# Patient Record
Sex: FEMALE | NOT HISPANIC OR LATINO | Employment: UNEMPLOYED | URBAN - METROPOLITAN AREA
[De-identification: names, ages, dates, MRNs, and addresses within clinical notes are randomized per-mention and may not be internally consistent; named-entity substitution may affect disease eponyms.]

---

## 2017-08-08 ENCOUNTER — ANESTHESIA - HEALTHEAST (OUTPATIENT)
Dept: OBGYN | Facility: CLINIC | Age: 32
End: 2017-08-08

## 2017-08-08 ASSESSMENT — MIFFLIN-ST. JEOR: SCORE: 1251.07

## 2017-08-09 ENCOUNTER — SURGERY - HEALTHEAST (OUTPATIENT)
Dept: OBGYN | Facility: CLINIC | Age: 32
End: 2017-08-09

## 2017-08-14 ENCOUNTER — COMMUNICATION - HEALTHEAST (OUTPATIENT)
Dept: OBGYN | Facility: CLINIC | Age: 32
End: 2017-08-14

## 2017-08-15 ENCOUNTER — COMMUNICATION - HEALTHEAST (OUTPATIENT)
Dept: OBGYN | Facility: CLINIC | Age: 32
End: 2017-08-15

## 2018-02-05 ENCOUNTER — COMMUNICATION - HEALTHEAST (OUTPATIENT)
Dept: SCHEDULING | Facility: CLINIC | Age: 33
End: 2018-02-05

## 2018-02-07 ENCOUNTER — COMMUNICATION - HEALTHEAST (OUTPATIENT)
Dept: SCHEDULING | Facility: CLINIC | Age: 33
End: 2018-02-07

## 2019-02-11 ENCOUNTER — OFFICE VISIT (OUTPATIENT)
Dept: FAMILY MEDICINE | Facility: CLINIC | Age: 34
End: 2019-02-11
Payer: COMMERCIAL

## 2019-02-11 VITALS
HEART RATE: 86 BPM | WEIGHT: 113.2 LBS | OXYGEN SATURATION: 99 % | SYSTOLIC BLOOD PRESSURE: 111 MMHG | TEMPERATURE: 99.8 F | DIASTOLIC BLOOD PRESSURE: 73 MMHG

## 2019-02-11 DIAGNOSIS — J06.9 VIRAL UPPER RESPIRATORY ILLNESS: Primary | ICD-10-CM

## 2019-02-11 DIAGNOSIS — J02.9 SORE THROAT: ICD-10-CM

## 2019-02-11 LAB
DEPRECATED S PYO AG THROAT QL EIA: NORMAL
SPECIMEN SOURCE: NORMAL

## 2019-02-11 PROCEDURE — 87081 CULTURE SCREEN ONLY: CPT | Performed by: NURSE PRACTITIONER

## 2019-02-11 PROCEDURE — 99213 OFFICE O/P EST LOW 20 MIN: CPT | Performed by: NURSE PRACTITIONER

## 2019-02-11 PROCEDURE — 87880 STREP A ASSAY W/OPTIC: CPT | Performed by: NURSE PRACTITIONER

## 2019-02-11 NOTE — PATIENT INSTRUCTIONS
1.  Rest and push fluids.  2.  Information below on sore throat and upper respiratory infections.  3.  Rapid strep was negative, culture is pending.  I will call you tomorrow if results are positive for treatment.  4.  Follow-up in clinic if any persistent or worsening symptoms over the next week.    Self-Care for Sore Throats    Sore throats happen for many reasons, such as colds, allergies, and infections caused by viruses or bacteria. In any case, your throat becomes red and sore. Your goal for self-care is to reduce your discomfort while giving your throat a chance to heal.  Moisten and soothe your throat  Tips include the following:    Try a sip of water first thing after waking up.    Keep your throat moist by drinking 6 or more glasses of clear liquids every day.    Run a cool-air humidifier in your room overnight.    Avoid cigarette smoke.     Suck on throat lozenges, cough drops, hard candy, ice chips, or frozen fruit-juice bars. Use the sugar-free versions if your diet or medical condition requires them.  Gargle to ease irritation  Gargling every hour or 2 can ease irritation. Try gargling with 1 of these solutions:    1/4 teaspoon of salt in 1/2 cup of warm water    An over-the-counter anesthetic gargle  Use medicine for more relief  Over-the-counter medicine can reduce sore throat symptoms. Ask your pharmacist if you have questions about which medicine to use:    Ease pain with anesthetic sprays. Aspirin or an aspirin substitute also helps. Remember, never give aspirin to anyone 18 or younger, or if you are already taking blood thinners.     For sore throats caused by allergies, try antihistamines to block the allergic reaction.    Remember: unless a sore throat is caused by a bacterial infection, antibiotics won t help you.  Prevent future sore throats  Prevention tips include the following:    Stop smoking or reduce contact with secondhand smoke. Smoke irritates the tender throat lining.    Limit  contact with pets and with allergy-causing substances, such as pollen and mold.    When you re around someone with a sore throat or cold, wash your hands often to keep viruses or bacteria from spreading.    Don t strain your vocal cords.  Call your healthcare provider  Contact your healthcare provider if you have:    A temperature over 101 F (38.3 C)    White spots on the throat    Great difficulty swallowing    Trouble breathing    A skin rash    Recent exposure to someone else with strep bacteria    Severe hoarseness and swollen glands in the neck or jaw   Date Last Reviewed: 8/1/2016 2000-2018 [employer]. [address] [place] [address]. All rights reserved. This information is not intended as a substitute for professional medical care. Always follow your healthcare professional's instructions.           Patient Education     Viral Upper Respiratory Illness (Adult)  You have a viral upper respiratory illness (URI), which is another term for the common cold. This illness is contagious during the first few days. It is spread through the air by coughing and sneezing. It may also be spread by direct contact (touching the sick person and then touching your own eyes, nose, or mouth). Frequent handwashing will decrease risk of spread. Most viral illnesses go away within 7 to 10 days with rest and simple home remedies. Sometimes the illness may last for several weeks. Antibiotics will not kill a virus, and they are generally not prescribed for this condition.    Home care    If symptoms are severe, rest at home for the first 2 to 3 days. When you resume activity, don't let yourself get too tired.    Don't smoke. If you need help stopping, talk with your healthcare provider.    Avoid being exposed to cigarette smoke (yours or others ).    You may use acetaminophen or ibuprofen to control pain and fever, unless another medicine was prescribed. If you have chronic liver or kidney disease, have  ever had a stomach ulcer or gastrointestinal bleeding, or are taking blood-thinning medicines, talk with your healthcare provider before using these medicines. Aspirin should never be given to anyone under 18 years of age who is ill with a viral infection or fever. It may cause severe liver or brain damage.    Your appetite may be poor, so a light diet is fine. Stay well hydrated by drinking 6 to 8 glasses of fluids per day (water, soft drinks, juices, tea, or soup). Extra fluids will help loosen secretions in the nose and lungs.    Over-the-counter cold medicines will not shorten the length of time you re sick, but they may be helpful for the following symptoms: cough, sore throat, and nasal and sinus congestion. If you take prescription medicines, ask your healthcare provider or pharmacist which over-the-counter medicines are safe to use. (Note: Don't use decongestants if you have high blood pressure.)  Follow-up care  Follow up with your healthcare provider, or as advised.  When to seek medical advice  Call your healthcare provider right away if any of these occur:    Cough with lots of colored sputum (mucus)    Severe headache; face, neck, or ear pain    Difficulty swallowing due to throat pain    Fever of 100.4 F (38 C) or higher, or as directed by your healthcare provider  Call 911  Call 911 if any of these occur:    Chest pain, shortness of breath, wheezing, or difficulty breathing    Coughing up blood    Very severe pain with swallowing, especially if it goes along with a muffled voice   Date Last Reviewed: 6/1/2018 2000-2018 [employer]. [address] [place] [address], [place] [address]. All rights reserved. This information is not intended as a substitute for professional medical care. Always follow your healthcare professional's instructions.

## 2019-02-11 NOTE — LETTER
February 12, 2019      Aura Hope  [id number] [facility] [id number]          The results of your 24 hour throat culture were negative. If you have any further questions please contact your clinic.            Sincerely,        Maranda Craig NP

## 2019-02-11 NOTE — PROGRESS NOTES
SUBJECTIVE:   Aura Hope is a 34 year old female who presents to clinic today for the following health issues:    ENT Symptoms             Symptoms: cc Present Absent Comment   Fever/Chills  x  Low grade   Fatigue  x     Muscle Aches  x     Eye Irritation   x    Sneezing   x    Nasal [place]/Drg   x    Sinus Pressure/Pain   x    Loss of smell   x    Dental pain   x    Sore Throat x x     Swollen Glands  x     Ear Pain/Fullness   x    Cough   x    Wheeze   x    Chest Pain   x    Shortness of breath   x    Rash   x    Other   x      Symptom duration:  2 days    Symptom severity:  moderate   Treatments tried:  nyquil   Contacts:  family      Decrease in appetite but drinking fluids.  Hx of strep in the past.    Problem list and histories reviewed & adjusted, as indicated.  Additional history: as documented    There is no problem list on file for this patient.    History reviewed. No pertinent surgical history.    Social History     Tobacco Use     Smoking status: Never Smoker     Smokeless tobacco: Never Used   Substance Use Topics     Alcohol use: Not on file     History reviewed. No pertinent family history.      No current outpatient medications on file.     Not on File    Reviewed and updated as needed this visit by clinical staff  Tobacco  Allergies  Meds  Problems  Med Hx  Surg Hx  Fam Hx  Soc Hx        Reviewed and updated as needed this visit by Provider  Tobacco  Allergies  Meds  Problems  Med Hx  Surg Hx  Fam Hx         ROS:  CONSTITUTIONAL: NEGATIVE for fever, chills, change in weight  INTEGUMENTARY/SKIN: NEGATIVE for worrisome rashes, moles or lesions  ENT/MOUTH: POSITIVE for sore throat  RESP: NEGATIVE for significant cough or SOB  CV: NEGATIVE for chest pain, palpitations or peripheral edema  GI: NEGATIVE for nausea, abdominal pain, heartburn, or change in bowel habits  PSYCHIATRIC: NEGATIVE for changes in mood or affect  ROS otherwise negative    OBJECTIVE:     /73   Pulse 86    "Temp 99.8  F (37.7  C) (Tympanic)   Wt 51.3 kg (113 lb 3.2 oz)   .9 cm (61\")   SpO2 99%   There is no height or weight on file to calculate BMI.  GENERAL: healthy, alert and no distress  EYES: Eyes grossly normal to inspection, PERRL and conjunctivae and sclerae normal  HENT: normal cephalic/atraumatic, ear canals and TM's normal, nose and mouth without ulcers or lesions, oropharynx clear, oral mucous membranes moist and tonsillar hypertrophy  NECK: no adenopathy and no asymmetry, masses, or scars  RESP: lungs clear to auscultation - no rales, rhonchi or wheezes  CV: regular rate and rhythm, normal S1 S2, no S3 or S4, no murmur, click or rub, no peripheral edema and peripheral pulses strong  PSYCH: mentation appears normal, affect normal/bright    Diagnostic Test Results:  Results for orders placed or performed in visit on 02/11/19 (from the past 24 hour(s))   Strep, Rapid Screen   Result Value Ref Range    Specimen Description Throat     Rapid Strep A Screen       NEGATIVE: No Group A streptococcal antigen detected by immunoassay, await culture report.       ASSESSMENT/PLAN:     1. Pharyngitis  Rapid strep negative.  Culture pending.  I will call tomorrow if culture is positive for treatment.  Most likely viral URI cause.  - Strep, Rapid Screen  - Beta strep group A culture    2. Viral upper respiratory illness  Treating with conservative management.  Handout given and discussed.  Follow-up in clinic if any persistent or worsening symptoms.      See Patient Instructions    Maranda Craig NP  [facility]  "

## 2019-02-12 LAB
BACTERIA SPEC CULT: NORMAL
SPECIMEN SOURCE: NORMAL

## 2019-08-27 ENCOUNTER — TRANSFERRED RECORDS (OUTPATIENT)
Dept: HEALTH INFORMATION MANAGEMENT | Facility: CLINIC | Age: 34
End: 2019-08-27

## 2019-08-27 ENCOUNTER — RECORDS - HEALTHEAST (OUTPATIENT)
Dept: ADMINISTRATIVE | Facility: OTHER | Age: 34
End: 2019-08-27

## 2019-08-27 LAB
C TRACH DNA SPEC QL PROBE+SIG AMP: NEGATIVE
CHLAMYDIA_EXT- HISTORICAL: NEGATIVE
HPV ABSTRACT: ABNORMAL
HPV_EXT - HISTORICAL: ABNORMAL
PAP SMEAR - HIM PATIENT REPORTED: ABNORMAL
PAP-ABSTRACT: ABNORMAL
SPECIMEN DESCRIPTION: NORMAL
SPECIMEN DESCRIPTION_EXT (HISTORICAL CONVERSION): NORMAL

## 2019-10-25 ENCOUNTER — OFFICE VISIT - HEALTHEAST (OUTPATIENT)
Dept: FAMILY MEDICINE | Facility: CLINIC | Age: 34
End: 2019-10-25

## 2019-10-25 DIAGNOSIS — J02.9 SORE THROAT: ICD-10-CM

## 2019-10-25 DIAGNOSIS — J02.9 VIRAL PHARYNGITIS: ICD-10-CM

## 2019-10-25 LAB — DEPRECATED S PYO AG THROAT QL EIA: NORMAL

## 2019-10-26 LAB — GROUP A STREP BY PCR: NORMAL

## 2019-11-08 ENCOUNTER — COMMUNICATION - HEALTHEAST (OUTPATIENT)
Dept: ADMINISTRATIVE | Facility: CLINIC | Age: 34
End: 2019-11-08

## 2019-11-21 ENCOUNTER — COMMUNICATION - HEALTHEAST (OUTPATIENT)
Dept: ADMINISTRATIVE | Facility: CLINIC | Age: 34
End: 2019-11-21

## 2019-11-21 ENCOUNTER — AMBULATORY - HEALTHEAST (OUTPATIENT)
Dept: OBGYN | Facility: CLINIC | Age: 34
End: 2019-11-21

## 2019-11-21 DIAGNOSIS — Z01.812 PRE-PROCEDURE LAB EXAM: ICD-10-CM

## 2019-12-10 ENCOUNTER — RECORDS - HEALTHEAST (OUTPATIENT)
Dept: HEALTH INFORMATION MANAGEMENT | Facility: CLINIC | Age: 34
End: 2019-12-10

## 2019-12-31 ENCOUNTER — AMBULATORY - HEALTHEAST (OUTPATIENT)
Dept: LAB | Facility: CLINIC | Age: 34
End: 2019-12-31

## 2019-12-31 ENCOUNTER — AMBULATORY - HEALTHEAST (OUTPATIENT)
Dept: OBGYN | Facility: CLINIC | Age: 34
End: 2019-12-31

## 2019-12-31 DIAGNOSIS — Z01.812 PRE-PROCEDURE LAB EXAM: ICD-10-CM

## 2019-12-31 DIAGNOSIS — R87.610 ASCUS WITH POSITIVE HIGH RISK HPV CERVICAL: ICD-10-CM

## 2019-12-31 DIAGNOSIS — R87.810 ASCUS WITH POSITIVE HIGH RISK HPV CERVICAL: ICD-10-CM

## 2019-12-31 LAB — HCG UR QL: NEGATIVE

## 2019-12-31 ASSESSMENT — MIFFLIN-ST. JEOR: SCORE: 1155.81

## 2020-02-05 ENCOUNTER — OFFICE VISIT (OUTPATIENT)
Dept: FAMILY MEDICINE | Facility: CLINIC | Age: 35
End: 2020-02-05
Payer: COMMERCIAL

## 2020-02-05 VITALS
WEIGHT: 114 LBS | HEIGHT: 62 IN | TEMPERATURE: 98.6 F | OXYGEN SATURATION: 100 % | SYSTOLIC BLOOD PRESSURE: 100 MMHG | BODY MASS INDEX: 20.98 KG/M2 | HEART RATE: 66 BPM | DIASTOLIC BLOOD PRESSURE: 70 MMHG

## 2020-02-05 DIAGNOSIS — K14.6 SORENESS OF TONGUE: Primary | ICD-10-CM

## 2020-02-05 PROCEDURE — 99212 OFFICE O/P EST SF 10 MIN: CPT | Performed by: NURSE PRACTITIONER

## 2020-02-05 ASSESSMENT — MIFFLIN-ST. JEOR: SCORE: 1165.35

## 2020-02-05 NOTE — PROGRESS NOTES
"David Hope is a 35 year old female who presents to clinic today for the following health issues:    HPI   Mouth soreness      Duration: 3 days    Description (location/character/radiation): patient here c/o soreness on right side of tongue three days ago, now has pain in right ear and feels a swollen lymph node in neck    Intensity:  moderate    Accompanying signs and symptoms: denies fever;     She doesn't feel sick.    History (similar episodes/previous evaluation): None    No new contacts or exposures.    Precipitating or alleviating factors: None    Therapies tried and outcome: None               Reviewed and updated as needed this visit by Provider  Tobacco  Allergies  Meds  Problems  Med Hx  Surg Hx  Fam Hx         Review of Systems   ROS COMP: Constitutional, HEENT, cardiovascular, pulmonary, gi and gu systems are negative, except as otherwise noted.      Objective    /70 (BP Location: Right arm)   Pulse 66   Temp 98.6  F (37  C) (Tympanic)   Ht 1.575 m (5' 2\")   Wt 51.7 kg (114 lb)   SpO2 100%   Breastfeeding No   BMI 20.85 kg/m    Body mass index is 20.85 kg/m .  Physical Exam   GENERAL: healthy, alert and no distress  HENT: ear canals and TM's normal, nose and mouth without ulcers or lesions  NECK: no adenopathy, no asymmetry, masses, or scars and thyroid normal to palpation  RESP: lungs clear to auscultation - no rales, rhonchi or wheezes  CV: regular rate and rhythm, normal S1 S2, no S3 or S4, no murmur, click or rub, no peripheral edema and peripheral pulses strong            Assessment & Plan       ICD-10-CM    1. Soreness of tongue K14.6      Soreness of right lateral tongue for 3 days.  Now with referred pain to the right ear.  Exam was benign - no tongue or ear abnormality seen.  No lymphadenopathy.    Recommend monitoring.  Follow up in one week if symptoms worsen or do not improve.         Return in about 1 week (around 2/12/2020), or if symptoms worsen or fail " to improve.    The risks, benefits and treatment options of prescribed medications or other treatments have been discussed with the patient. The patient verbalized their understanding and should call or follow up if no improvement or if they develop further problems.    CHRISTINE Yao [facility]

## 2020-02-06 ENCOUNTER — MYC MEDICAL ADVICE (OUTPATIENT)
Dept: FAMILY MEDICINE | Facility: CLINIC | Age: 35
End: 2020-02-06

## 2020-02-07 ENCOUNTER — OFFICE VISIT (OUTPATIENT)
Dept: FAMILY MEDICINE | Facility: CLINIC | Age: 35
End: 2020-02-07
Payer: COMMERCIAL

## 2020-02-07 VITALS
DIASTOLIC BLOOD PRESSURE: 62 MMHG | HEART RATE: 65 BPM | WEIGHT: 116.8 LBS | BODY MASS INDEX: 21.36 KG/M2 | SYSTOLIC BLOOD PRESSURE: 104 MMHG | OXYGEN SATURATION: 99 % | TEMPERATURE: 98.6 F

## 2020-02-07 DIAGNOSIS — H92.01 OTALGIA, RIGHT: Primary | ICD-10-CM

## 2020-02-07 PROCEDURE — 99213 OFFICE O/P EST LOW 20 MIN: CPT | Performed by: NURSE PRACTITIONER

## 2020-02-07 RX ORDER — IBUPROFEN 200 MG
200 TABLET ORAL EVERY 4 HOURS PRN
COMMUNITY
End: 2022-04-19

## 2020-02-07 NOTE — PROGRESS NOTES
David Hope is a 35 year old female who presents to clinic today for the following health issues:    HPI   Acute Illness   Acute illness concerns?- Ear pain  Onset: 4-5 days    Fever: no    Decreased energy level: no    Conjunctivitis:  YES- blurry    Ear Pain: YES: bilateral    Rhinorrhea: no     Congestion: no    Sore Throat: no     Cough: no    Wheeze: no    Breathing fast: no    Decreased Appetite: no    Nausea: no    Vomiting: no    Diarrhea:  YES    Sick/Strep Exposure: no       Therapies Tried and outcome: Ibuprofen-has been helping and she can tell when it wears off.     Tongue symptoms have resolved.    Kids are sick with colds      There is no problem list on file for this patient.    History reviewed. No pertinent surgical history.    Social History     Tobacco Use     Smoking status: Never Smoker     Smokeless tobacco: Never Used   Substance Use Topics     Alcohol use: Yes     Comment: 0-1 drink per week     History reviewed. No pertinent family history.      Current Outpatient Medications   Medication Sig Dispense Refill     ibuprofen (ADVIL/MOTRIN) 200 MG tablet Take 200 mg by mouth every 4 hours as needed for mild pain       No Known Allergies    Reviewed and updated as needed this visit by Provider  Tobacco  Allergies  Meds  Problems  Med Hx  Surg Hx  Fam Hx         Review of Systems   ROS COMP: Constitutional, HEENT, cardiovascular, pulmonary, gi and gu systems are negative, except as otherwise noted.      Objective    /62 (BP Location: Right arm, Patient Position: Sitting, Cuff Size: Adult Regular)   Pulse 65   Temp 98.6  F (37  C) (Tympanic)   Wt 53 kg (116 lb 12.8 oz)   SpO2 99%   BMI 21.36 kg/m    Body mass index is 21.36 kg/m .  Physical Exam   GENERAL: healthy, alert and no distress  HENT: ear canals and TM's normal, nose and mouth without ulcers or lesions  NECK: no adenopathy, no asymmetry, masses, or scars and thyroid normal to palpation             Assessment & Plan       ICD-10-CM    1. Otalgia, right H92.01 OTOLARYNGOLOGY REFERRAL     Again exam is negative.  Possible viral syndrome.  However, advised patient to see ENT next week if no improvement in ear pain.        Return in about 1 week (around 2/14/2020), or if symptoms worsen or fail to improve.    The risks, benefits and treatment options of prescribed medications or other treatments have been discussed with the patient. The patient verbalized their understanding and should call or follow up if no improvement or if they develop further problems.    CHRISTINE Yao [facility]

## 2020-03-11 ENCOUNTER — HEALTH MAINTENANCE LETTER (OUTPATIENT)
Age: 35
End: 2020-03-11

## 2021-01-03 ENCOUNTER — HEALTH MAINTENANCE LETTER (OUTPATIENT)
Age: 36
End: 2021-01-03

## 2021-02-10 ENCOUNTER — OFFICE VISIT (OUTPATIENT)
Dept: FAMILY MEDICINE | Facility: CLINIC | Age: 36
End: 2021-02-10
Payer: COMMERCIAL

## 2021-02-10 VITALS
WEIGHT: 114 LBS | DIASTOLIC BLOOD PRESSURE: 62 MMHG | SYSTOLIC BLOOD PRESSURE: 98 MMHG | HEIGHT: 62 IN | BODY MASS INDEX: 20.98 KG/M2 | RESPIRATION RATE: 18 BRPM | TEMPERATURE: 98.6 F | OXYGEN SATURATION: 98 % | HEART RATE: 74 BPM

## 2021-02-10 DIAGNOSIS — J02.9 SORE THROAT: Primary | ICD-10-CM

## 2021-02-10 LAB
DEPRECATED S PYO AG THROAT QL EIA: NEGATIVE
SPECIMEN SOURCE: NORMAL
SPECIMEN SOURCE: NORMAL
STREP GROUP A PCR: NOT DETECTED

## 2021-02-10 PROCEDURE — 99213 OFFICE O/P EST LOW 20 MIN: CPT | Performed by: NURSE PRACTITIONER

## 2021-02-10 PROCEDURE — 99N1174 PR STATISTIC STREP A RAPID: Performed by: NURSE PRACTITIONER

## 2021-02-10 PROCEDURE — 87651 STREP A DNA AMP PROBE: CPT | Performed by: NURSE PRACTITIONER

## 2021-02-10 ASSESSMENT — MIFFLIN-ST. JEOR: SCORE: 1160.35

## 2021-02-10 NOTE — PROGRESS NOTES
Assessment & Plan     Sore throat    - Streptococcus A Rapid Scr w Reflx to PCR  - Group A Streptococcus PCR Throat Swab  Strep negative, see below for plan.  She states she has a home COVID test and may consider doing that if she doesn't feel better soon.           See Patient Instructions  Patient Instructions   Strep was negative.  Continue to monitor symptoms.  Rest, fluids, humidify the air, warm salt water gargles and ibuprofen as needed.      Our Clinic hours are:  Mondays    7:20 am - 7 pm  Tues -  Fri  7:20 am - 5 pm    Clinic Phone: [phone]    The clinic lab opens at 7:30 am Mon - Fri and appointments are required.    [facility]. [phone]  Monday  8 am - 7pm  Tues - Fri 8 am - 5:30 pm             Return in about 1 week (around 2/17/2021).    CHRISTINE Watson CNP  [facility]    David Chaavrria is a 36 year old who presents for the following health issues  accompanied by her self :    HPI       Acute Illness  Acute illness concerns: sore throat   Onset/Duration: 2 weeks   Symptoms: Patient has had a ST for 2 weeks she has been using tea and increased fluids   Fever: no  Chills/Sweats: no  Headache (location?): YES  Sinus Pressure: no  Conjunctivitis:  no  Ear Pain: no  Rhinorrhea: no  Congestion: no  Sore Throat: YES-   Cough: no  Wheeze: no  Decreased Appetite: no  Nausea: no  Vomiting: no  Diarrhea: no  Dysuria/Freq.: no  Dysuria or Hematuria: no  Fatigue/Achiness: YES- little   Sick/Strep Exposure: no  Therapies tried and outcome: Patient used  ibuprofen 3 days ago but has been using tea increased liquids       Review of Systems   See above      Objective    There were no vitals taken for this visit.  There is no height or weight on file to calculate BMI.  Physical Exam   GENERAL: healthy, alert and no distress  HENT: ear canals and TM's normal, pharynx without erythema, tonsils are 2+  NECK: mild tonsillar adenopathy, no  asymmetry  RESP: lungs clear to auscultation - no rales, rhonchi or wheezes  CV: regular rate and rhythm, normal S1 S2, no S3 or S4, no murmur  MS: no gross musculoskeletal defects noted      Results for orders placed or performed in visit on 02/10/21 (from the past 24 hour(s))   Streptococcus A Rapid Scr w Reflx to PCR    Specimen: Throat   Result Value Ref Range    Strep Specimen Description Throat     Streptococcus Group A Rapid Screen Negative NEG^Negative

## 2021-02-10 NOTE — PATIENT INSTRUCTIONS
Strep was negative.  Continue to monitor symptoms.  Rest, fluids, humidify the air, warm salt water gargles and ibuprofen as needed.      Our Clinic hours are:  Mondays    7:20 am - 7 pm  Tues -  Fri  7:20 am - 5 pm    Clinic Phone: [phone]    The clinic lab opens at 7:30 am Mon - Fri and appointments are required.    [facility]. [phone]  Monday  8 am - 7pm  Tues - Fri 8 am - 5:30 pm

## 2021-02-22 ENCOUNTER — E-VISIT (OUTPATIENT)
Dept: FAMILY MEDICINE | Facility: CLINIC | Age: 36
End: 2021-02-22
Payer: COMMERCIAL

## 2021-02-22 DIAGNOSIS — R51.9 NONINTRACTABLE HEADACHE, UNSPECIFIED CHRONICITY PATTERN, UNSPECIFIED HEADACHE TYPE: Primary | ICD-10-CM

## 2021-02-22 PROCEDURE — 99421 OL DIG E/M SVC 5-10 MIN: CPT | Performed by: NURSE PRACTITIONER

## 2021-02-23 ENCOUNTER — E-VISIT (OUTPATIENT)
Dept: FAMILY MEDICINE | Facility: CLINIC | Age: 36
End: 2021-02-23
Payer: COMMERCIAL

## 2021-02-23 DIAGNOSIS — J01.90 ACUTE BACTERIAL SINUSITIS: Primary | ICD-10-CM

## 2021-02-23 DIAGNOSIS — B96.89 ACUTE BACTERIAL SINUSITIS: Primary | ICD-10-CM

## 2021-02-23 PROCEDURE — 99421 OL DIG E/M SVC 5-10 MIN: CPT | Performed by: NURSE PRACTITIONER

## 2021-02-24 NOTE — PATIENT INSTRUCTIONS
Dear Aura Hope    After reviewing your responses, I've been able to diagnose you with?a sinus infection caused by bacteria.?     Based on your responses and diagnosis, I have prescribed Augmentin to treat your symptoms. I have sent this to your pharmacy.?     It is also important to stay well hydrated, get lots of rest and take over-the-counter decongestants,?tylenol?or ibuprofen if you?are able to?take those medications per your primary care provider to help relieve discomfort.?     It is important that you take?all of?your prescribed medication even if your symptoms are improving after a few doses.? Taking?all of?your medicine helps prevent the symptoms from returning.?     If your symptoms worsen, you develop severe headache, vomiting, high fever (>102), or are not improving in 7 days, please contact your primary care provider for an appointment or visit any of our convenient Walk-in Care or Urgent Care Centers to be seen which can be found on our website?here.?     Thanks again for choosing?us?as your health care partner,?   ?  CHRISTINE Yao CNP?   You may want to try a nasal lavage (also known as nasal irrigation). You can find over-the-counter products, such as Neti-Pot, at retail locations or make your own at home. Instructions for homemade nasal lavage and more information on the process are available online at http://www.aafp.org/[email or web address]/1115/p1121.html.      Sinusitis (Antibiotic Treatment)    The sinuses are air-filled spaces within the bones of the face. They connect to the inside of the nose. Sinusitis is an inflammation of the tissue that lines the sinuses. Sinusitis can occur during a cold. It can also happen due to allergies to pollens and other particles in the air. Sinusitis can cause symptoms of sinus congestion and a feeling of fullness. A sinus infection causes fever, headache, and facial pain. There is often green or yellow fluid draining from the nose or into the back of the  throat (post-nasal drip). You have been given antibiotics to treat this condition.   Home care    Take the full course of antibiotics as instructed. Don't stop taking them, even when you feel better.    Drink plenty of water, hot tea, and other liquids as directed by the healthcare provider. This may help thin nasal mucus. It also may help your sinuses drain fluids.    Heat may help soothe painful areas of your face. Use a towel soaked in hot water. Or,  the shower and direct the warm spray onto your face. Using a vaporizer along with a menthol rub at night may also help soothe symptoms.     An expectorant with guaifenesin may help thin nasal mucus and help your sinuses drain fluids. Talk with your provider or pharmacists before taking an over-the-counter (OTC) medicine if you have any questions about it or its side effects..    You can use an OTC decongestant, unless a similar medicine was prescribed to you. Nasal sprays work the fastest. Use one that contains phenylephrine or oxymetazoline. First blow your nose gently. Then use the spray. Don't use these medicines more often than directed on the label. If you do, your symptoms may get worse. You may also take pills that contain pseudoephedrine. Don t use products that combine multiple medicines. This is because side effects may be increased. Read labels. You can also ask the pharmacist for help. (People with high blood pressure should not use decongestants. They can raise blood pressure.) Talk with your provider or pharmacist if you have any questions about the medicine..    OTC antihistamines may help if allergies contributed to your sinusitis. Talk with your provider or pharmacist if you have any questions about the medicine..    Don't use nasal rinses or irrigation during an acute sinus infection, unless your healthcare provider tells you to. Rinsing may spread the infection to other areas in your sinuses.    Use acetaminophen or ibuprofen to control  pain, unless another pain medicine was prescribed to you. If you have chronic liver or kidney disease or ever had a stomach ulcer, talk with your healthcare provider before using these medicines. Never give aspirin to anyone under age 18 who is ill with a fever. It may cause severe liver damage.    Don't smoke. This can make symptoms worse.    Follow-up care  Follow up with your healthcare provider, or as advised.   When to seek medical advice  Call your healthcare provider if any of these occur:     Facial pain or headache that gets worse    Stiff neck    Unusual drowsiness or confusion    Swelling of your forehead or eyelids    Symptoms don't go away in 10 days    Vision problems, such as blurred or double vision    Fever of 100.4 F (38 C) or higher, or as directed by your healthcare provider  Call 911  Call 911 if any of these occur:     Seizure    Trouble breathing    Feeling dizzy or faint    Fingernails, skin or lips look blue, purple , or gray  Prevention  Here are steps you can take to help prevent an infection:     Keep good hand washing habits.    Don t have close contact with people who have sore throats, colds, or other upper respiratory infections.    Don t smoke, and stay away from secondhand smoke.    Stay up to date with of your vaccines.  Jott last reviewed this educational content on 12/1/2019 2000-2020 [employer]. [address] [place] [address], [place] [address]. All rights reserved. This information is not intended as a substitute for professional medical care. Always follow your healthcare professional's instructions.

## 2021-04-25 ENCOUNTER — HEALTH MAINTENANCE LETTER (OUTPATIENT)
Age: 36
End: 2021-04-25

## 2021-05-31 VITALS — HEIGHT: 62 IN | BODY MASS INDEX: 24.66 KG/M2 | WEIGHT: 134 LBS

## 2021-06-02 NOTE — PROGRESS NOTES
Chief Complaint   Patient presents with     Headache     Sore Throat     3 days     Nausea       HPI:  Aura Hope is a 34 y.o. female who complains of moderate sore throat onset 3 days ago, as well as nausea & fatigue today. Pt also has a HA but this has been ongoing and she has been seeing her PCP for this; no change in HA. Symptoms are constant in duration. No treatments tried. Denies fever, sinus pressure, congestion, cough, CP, SOB, abd pain, V/D, rash, or any other symptoms. Patient was exposed to strep at work.    Problem List:  2017:  delivery delivered  2017: Pregnant      No past medical history on file.    Social History     Tobacco Use     Smoking status: Never Smoker     Smokeless tobacco: Never Used   Substance Use Topics     Alcohol use: No       Review of Systems   Constitutional: Positive for fatigue. Negative for chills and fever.   HENT: Positive for sore throat. Negative for congestion and sinus pain.    Respiratory: Negative for cough and shortness of breath.    Cardiovascular: Negative for chest pain.   Gastrointestinal: Positive for nausea. Negative for abdominal pain, diarrhea and vomiting.   Skin: Negative for rash.   Neurological: Positive for headaches.   All other systems reviewed and are negative.      Vitals:    10/25/19 1035   BP: 116/76   Patient Position: Sitting   Pulse: 79   Resp: 16   Temp: 98.6  F (37  C)   TempSrc: Oral   SpO2: 100%   Weight: 112 lb (50.8 kg)       Physical Exam   Constitutional: She appears well-developed and well-nourished.  Non-toxic appearance.   HENT:   Head: Normocephalic and atraumatic.   Right Ear: Tympanic membrane, external ear and ear canal normal.   Left Ear: Tympanic membrane and ear canal normal.   Nose: Nose normal.   Mouth/Throat: Uvula is midline. Posterior oropharyngeal erythema (mild) present. No oropharyngeal exudate, posterior oropharyngeal edema or tonsillar abscesses.   Cardiovascular: Normal rate, regular rhythm and  normal heart sounds.   Pulmonary/Chest: Effort normal and breath sounds normal.   Neurological: She is alert.   Skin: Skin is warm and dry.   Psychiatric: She has a normal mood and affect.       Labs:  Recent Results (from the past 72 hour(s))   Rapid Strep A Screen-Throat swab   Result Value Ref Range    Rapid Strep A Antigen No Group A Strep detected, presumptive negative No Group A Strep detected, presumptive negative       Radiology:    No results found.    Clinical Decision Making:    Strep negative, c/w viral etiology. Advised Ibuprofen, Tylenol, increased fluids & rest.    At the end of the encounter, I discussed results, diagnosis, medications. Discussed red flags for immediate return to clinic/ER, as well as indications for follow up if no improvement. Patient understood and agreed to plan. Patient was stable for discharge.    1. Viral pharyngitis     2. Sore throat  Rapid Strep A Screen-Throat swab    Group A Strep, RNA Direct Detection, Throat         Return in about 1 week (around 11/1/2019) for Follow up w/ primary care provider if not improved.    SEBASTIAN Correa, PA-C  [facility]

## 2021-06-03 VITALS
OXYGEN SATURATION: 100 % | RESPIRATION RATE: 16 BRPM | HEART RATE: 79 BPM | SYSTOLIC BLOOD PRESSURE: 116 MMHG | DIASTOLIC BLOOD PRESSURE: 76 MMHG | BODY MASS INDEX: 20.49 KG/M2 | WEIGHT: 112 LBS | TEMPERATURE: 98.6 F

## 2021-06-03 NOTE — TELEPHONE ENCOUNTER
Name of caller: Patient  Phone number where you may be reached: [phone]  Reason for call: Pt is wondering if she cannot locate Dr Jocelyne Zamora, her previous provider, if Dr Vega would be willing to do a Mu[facility] if she provided her recent abnormal pap results. Pt had a bx in the past at [facility] and [facility] and it was not a good experience.  Best time to call back: any  If we don't reach you, is it okay to leave a detailed message? yes         Simponi Counseling:  I discussed with the patient the risks of golimumab including but not limited to myelosuppression, immunosuppression, autoimmune hepatitis, demyelinating diseases, lymphoma, and serious infections.  The patient understands that monitoring is required including a PPD at baseline and must alert us or the primary physician if symptoms of infection or other concerning signs are noted.

## 2021-06-03 NOTE — TELEPHONE ENCOUNTER
We are awaiting records from [facility]. Pt has requested them already. Pls scan into media if we get them far enough ahead of the 12/10 appt otherwise make sure Dr Vega has them for the pt's 12/10 appt at [facility].

## 2021-06-04 VITALS
HEART RATE: 64 BPM | HEIGHT: 62 IN | WEIGHT: 113 LBS | DIASTOLIC BLOOD PRESSURE: 60 MMHG | SYSTOLIC BLOOD PRESSURE: 108 MMHG | BODY MASS INDEX: 20.8 KG/M2

## 2021-06-04 NOTE — PROGRESS NOTES
"Colposcopy Procedure Note    Indications: Pap smear on 8/27/19 showed ASCUS with +other HRHPV.  Pertinent past history includes a colposcopy with biopsy showing ROSSANA I in 2015.  She is having issues with increased stomach pain and bloating and some nausea.  She also notes some increased fatigue.  Her sister was gluten free for 2 years, and her cousin was just diagnosed with lactose intolerance.  She hasn't done any tracking of her diet and symptoms.  She is looking for a new primary since her move to [facility].    Procedure Details   /60   Pulse 64   Ht 5' 2\" (1.575 m)   Wt 113 lb (51.3 kg)   LMP 12/03/2019   Body mass index is 20.67 kg/m .    The reason for procedure is explained, and informed consent is obtained.  Urine hCG is negative.    Speculum is placed in the vagina and visualization of cervix is achieved. The cervix is swabbed with 3% acetic acid solution. Transformation zone is easily seen.  Green filter is applied with no abnormal vessels seen.  Acetowhite epithelium is not seen.  ECC is not done.  Adequate colposcopy.  The patient tolerated the procedure well.    Impression: normal cervix    Plan: Post procedure instructions are reviewed.  The role of HPV in causing cervical pap smear abnormalities, dysplasia, and cancer is reviewed with the patient.  We discussed the role of the immune system and ways to keep it strong.  She was counseled to return to her primary for a Pap with HPV testing as recommended in a year.  I recommended the [facility] in [facility] to find a primary provider.  In the meantime I recommended she track all she eats and drinks along with her symptoms to see if there's a pattern.  "

## 2021-06-12 NOTE — ANESTHESIA PROCEDURE NOTES
Epidural Block    Patient location during procedure: OB  Time Called: 8/8/2017 9:47 PM  Reason for Block:labor epidural  Staffing:  Performing  Anesthesiologist: ANITA PABON  Preanesthetic Checklist  Completed: patient identified, risks, benefits, and alternatives discussed, timeout performed, consent obtained, airway assessed, oxygen available, suction available, emergency drugs available and hand hygiene performed  Procedure  Patient position: sitting  Prep: ChloraPrep  Patient monitoring: continuous pulse oximetry  Approach: midline  Location: L3-L4  Injection technique: RAMIN air  Number of Attempts:1  Needle  Needle type: Tawanda   Needle gauge: 18 G     Catheter in Space: 5  Assessment  Sensory level:  No complications

## 2021-06-12 NOTE — ANESTHESIA CARE TRANSFER NOTE
Last vitals:   Vitals:    08/09/17 0907   BP: 102/52   Pulse: 72   Resp: 16   Temp: 36.7  C (98.1  F)   SpO2: 99%     Patient's level of consciousness is awake  Spontaneous respirations: yes  Maintains airway independently: yes  Dentition unchanged: yes  Oropharynx: oropharynx clear of all foreign objects    QCDR Measures:  ASA# 20 - Surgical Safety Checklist: WHO surgical safety checklist completed prior to induction  PQRS# 430 - Adult PONV Prevention: NA - Not adult patient, not GA or 3 or more risk factors NOT present  ASA# 8 - Peds PONV Prevention: NA - Not pediatric patient, not GA or 2 or more risk factors NOT present  PQRS# 424 - Nathalie-op Temp Management: 4559F - At least one body temp DOCUMENTED => 35.5C or 95.9F within required timeframe  PQRS# 426 - PACU Transfer Protocol: - Transfer of care checklist used  ASA# 14 - Acute Post-op Pain: ASA14B - Patient did NOT experience pain >= 7 out of 10

## 2021-06-12 NOTE — ANESTHESIA PREPROCEDURE EVALUATION
Anesthesia Evaluation      Patient summary reviewed   No history of anesthetic complications     Airway   Mallampati: I  Neck ROM: full   Pulmonary - negative ROS and normal exam                          Cardiovascular - negative ROS and normal exam   Neuro/Psych - negative ROS     Endo/Other - negative ROS   (+) pregnant  (-) no obesity     GI/Hepatic/Renal - negative ROS           Dental - normal exam                        Anesthesia Plan  Planned anesthetic: epidural  Epidural.  Decadron, Zofran.  PE infusion.  Duramorph.  ASA 2     Anesthetic plan and risks discussed with: patient  Anesthesia plan special considerations: antiemetics,   Post-op plan: routine recovery

## 2021-06-16 PROBLEM — Z34.90 PREGNANT: Status: ACTIVE | Noted: 2017-08-08

## 2021-10-10 ENCOUNTER — HEALTH MAINTENANCE LETTER (OUTPATIENT)
Age: 36
End: 2021-10-10

## 2021-10-29 ENCOUNTER — IMMUNIZATION (OUTPATIENT)
Dept: FAMILY MEDICINE | Facility: CLINIC | Age: 36
End: 2021-10-29
Payer: COMMERCIAL

## 2021-10-29 PROCEDURE — 90471 IMMUNIZATION ADMIN: CPT

## 2021-10-29 PROCEDURE — 90686 IIV4 VACC NO PRSV 0.5 ML IM: CPT

## 2022-04-19 ENCOUNTER — OFFICE VISIT (OUTPATIENT)
Dept: FAMILY MEDICINE | Facility: CLINIC | Age: 37
End: 2022-04-19

## 2022-04-19 ENCOUNTER — E-VISIT (OUTPATIENT)
Dept: URGENT CARE | Facility: CLINIC | Age: 37
End: 2022-04-19
Payer: COMMERCIAL

## 2022-04-19 VITALS
OXYGEN SATURATION: 99 % | DIASTOLIC BLOOD PRESSURE: 76 MMHG | TEMPERATURE: 97.8 F | WEIGHT: 113.6 LBS | HEIGHT: 61 IN | HEART RATE: 95 BPM | SYSTOLIC BLOOD PRESSURE: 112 MMHG | RESPIRATION RATE: 14 BRPM | BODY MASS INDEX: 21.45 KG/M2

## 2022-04-19 DIAGNOSIS — J04.0 LARYNGITIS: ICD-10-CM

## 2022-04-19 DIAGNOSIS — R05.9 COUGH: Primary | ICD-10-CM

## 2022-04-19 DIAGNOSIS — J06.9 VIRAL URI WITH COUGH: ICD-10-CM

## 2022-04-19 DIAGNOSIS — H66.91 RIGHT ACUTE OTITIS MEDIA: Primary | ICD-10-CM

## 2022-04-19 PROCEDURE — 99213 OFFICE O/P EST LOW 20 MIN: CPT | Performed by: FAMILY MEDICINE

## 2022-04-19 PROCEDURE — 99207 PR NON-BILLABLE SERV PER CHARTING: CPT | Performed by: FAMILY MEDICINE

## 2022-04-19 ASSESSMENT — PAIN SCALES - GENERAL: PAINLEVEL: MODERATE PAIN (4)

## 2022-04-19 NOTE — PROGRESS NOTES
Assessment & Plan     Right acute otitis media  Patient was advised on exam findings. Possibly secondary infection from her viral URI. Discussed treatment of AOM in adults.  Return precautions discussed and given to patient.  Advised safe use of otc analgesics prn pain.  - amoxicillin-clavulanate (AUGMENTIN) 875-125 MG tablet  Dispense: 20 tablet; Refill: 0    Viral URI with cough  Patient was advised her symptoms are consistent with primarily a viral URI. No red flags or distress today.  Discussed still possible Covid-19 - offered CR testing today. Patient preferred to use her home Ag test. No PCR test collected today.  Advised symptomatic and supportive measures.  Return precautions discussed and given to patient.     Laryngitis  Advised most cases are viral. Treat symptomatically and supportively.  Return precautions discussed and given to patient.       Patient Instructions   Ou deferred covid-19 PCR test and preferred to use home covid-19 antigen test.    Start augmentin for treatment of right otitis media.    If you do test positive for covid-19 or if your symptoms do not improve in next 48 hours, extend home isolation by another 5 days.      Literature about patient's conditions and other advice has been attached to her after visit summary.    Return in about 3 days (around 4/22/2022) for In-clinic visit for if with fever or worsening symptoms.    Jean Poole MD  [facility]ES[facility] Chavarria is a 37 year old who presents for the following health issues   Chief Complaint   Patient presents with     URI     Pt being seen for cold symptoms.       URI    History of Present Illness       Reason for visit:  Sore throat. Ear ache. Congestion  Symptom onset:  1-3 days ago    She eats 2-3 servings of fruits and vegetables daily.She consumes 2 sweetened beverage(s) daily.She exercises with enough effort to increase her heart rate 20 to 29 minutes per day.  She exercises with enough  "effort to increase her heart rate 3 or less days per week.   She is taking medications regularly.       Acute Illness  Acute illness concerns: headache, cough, ear pain   Onset/Duration: 4 days ago  Symptoms:  Fever: no  Decreased energy level: YES  Conjunctivitis: YES- both eyes have been crusty past 2 days  Ear Pain: YES- right  Rhinorrhea: no  Congestion: YES  Sore Throat: YES, lots of drainage  Cough: YES - worse at night  Wheeze: no  Breathing fast: no           Decreased Appetite/Intake: YES  Nausea: no  Vomiting: no  Diarrhea: no  Progression of Symptoms: worse past couple of days constant  Sick/Strep Exposure: no  Therapies tried and outcome: mucinex cold med, cough gtts, ibuprofen    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 Will be [employer] all week this week.    Denies known Covid-19 exposure.    Review of Systems   C: NEGATIVE for fever, chills, change in weight  I: NEGATIVE for worrisome rashes, moles or lesions  E: NEGATIVE for vision changes or irritation  ENT/MOUTH: see above  RESP:as above  CV: NEGATIVE for chest pain, palpitations or peripheral edema  GI: NEGATIVE for nausea, abdominal pain, heartburn, or change in bowel habits  M: NEGATIVE for significant arthralgias or myalgia      Objective    /76   Pulse 95   Temp 97.8  F (36.6  C) (Tympanic)   Resp 14   Ht 1.556 m (5' 1.25\")   Wt 51.5 kg (113 lb 9.6 oz)   LMP 04/07/2022 (Approximate)   SpO2 99%   Breastfeeding No   BMI 21.29 kg/m    Body mass index is 21.29 kg/m .  Physical Exam   GENERAL: slightly underweight,  alert and no distress  EYES: pink conjunctivae, no icterus  NECK: mild cervical adenopathy, nontender  HEENT:  nose with mild congestion, no sinus tenderness, throat mildly erythematous, no exudates, no oral ulcers  LEFT EAR: EAM clear, tympanic membrane intact and no injection, no effusion  RIGHT EAR: EAM clear, tympanic membrane intact but with moderate erythema with mild bulging and dull COL, " mild effusion  RESP: lungs clear to auscultation - no rales, no rhonchi, no wheezes  CV: regular rates and rhythm, normal S1 S2, no S3 or S4 and no murmur  SKIN:  Good turgor, no rashes    No results found for any visits on 04/19/22.

## 2022-04-19 NOTE — PATIENT INSTRUCTIONS
Dear Aura Hope,    We are sorry you are not feeling well. Based on the responses you provided, it is recommended that you be seen in-person in urgent care so we can better evaluate your symptoms. Please click here to find the nearest urgent care location to you.   You will not be charged for this Visit. Thank you for trusting us with your care.    Lorna Higgins MD

## 2022-04-19 NOTE — PATIENT INSTRUCTIONS
Ou deferred covid-19 PCR test and preferred to use home covid-19 antigen test.    Start augmentin for treatment of right otitis media.    If you do test positive for covid-19 or if your symptoms do not improve in next 48 hours, extend home isolation by another 5 days.

## 2022-04-26 ENCOUNTER — OFFICE VISIT (OUTPATIENT)
Dept: FAMILY MEDICINE | Facility: CLINIC | Age: 37
End: 2022-04-26
Payer: COMMERCIAL

## 2022-04-26 VITALS
TEMPERATURE: 100.5 F | RESPIRATION RATE: 16 BRPM | SYSTOLIC BLOOD PRESSURE: 102 MMHG | OXYGEN SATURATION: 99 % | DIASTOLIC BLOOD PRESSURE: 70 MMHG | HEART RATE: 99 BPM | BODY MASS INDEX: 21.34 KG/M2 | HEIGHT: 61 IN | WEIGHT: 113 LBS

## 2022-04-26 DIAGNOSIS — Z11.4 SCREENING FOR HIV (HUMAN IMMUNODEFICIENCY VIRUS): ICD-10-CM

## 2022-04-26 DIAGNOSIS — J01.90 ACUTE SINUSITIS TREATED WITH ANTIBIOTICS IN THE PAST 60 DAYS: ICD-10-CM

## 2022-04-26 DIAGNOSIS — Z11.59 NEED FOR HEPATITIS C SCREENING TEST: ICD-10-CM

## 2022-04-26 DIAGNOSIS — J01.90 ACUTE SINUSITIS WITH SYMPTOMS GREATER THAN 10 DAYS: Primary | ICD-10-CM

## 2022-04-26 PROCEDURE — 99214 OFFICE O/P EST MOD 30 MIN: CPT | Performed by: FAMILY MEDICINE

## 2022-04-26 RX ORDER — LEVOFLOXACIN 750 MG/1
750 TABLET, FILM COATED ORAL DAILY
Qty: 5 TABLET | Refills: 0 | Status: SHIPPED | OUTPATIENT
Start: 2022-04-26 | End: 2022-05-01

## 2022-04-26 RX ORDER — FLUTICASONE PROPIONATE 50 MCG
1 SPRAY, SUSPENSION (ML) NASAL DAILY
Qty: 16 G | Refills: 1 | Status: SHIPPED | OUTPATIENT
Start: 2022-04-26 | End: 2023-08-08

## 2022-04-26 ASSESSMENT — PAIN SCALES - GENERAL: PAINLEVEL: SEVERE PAIN (6)

## 2022-04-26 NOTE — PROGRESS NOTES
Assessment & Plan     Acute sinusitis with symptoms greater than 10 days  Acute sinusitis treated with antibiotics in the past 60 days  - levofloxacin (LEVAQUIN) 750 MG tablet  Dispense: 5 tablet; Refill: 0  - fluticasone (FLONASE) 50 MCG/ACT nasal spray  Dispense: 16 g; Refill: 1  Unsuccessful treatment with augmentin - was given for AOM originally last week, but this should also cover ABRS.  Discussed with patient next line treatment for her condition.  Discussed possible risks of fluoroquinolones - this is recommended for failed initial treatment.  Patient concurred to start levofloxavin for 5 days.  Push oral fluids.  Discussed use of flonase. Patient concurred.  Return precautions discussed and given to patient.     Need for hepatitis C screening test  Patient will address this on her wellness visit.    Screening for HIV (human immunodeficiency virus)  Patient will address thi son her wellness visit.         Patient Instructions   Stop augmentin.  Start lvofloxacin.  Start flonase nasal spray as prescribed.    Ibuprofen 200 mg 1-2 tabs with food every 8 hrs as needed for pain.      Return in about 1 week (around 5/3/2022) for In-clinic visit for if not better.    Jean Poole MD  [facility]    David Chavarria is a 37 year old who presents for the following health issues   Chief Complaint   Patient presents with     Ear Problem     Pt here for a follow up on symptoms she was tx for on 4/19.  Not getting any better.       HPI     Acute Illness  Acute illness concerns: ears hurt, cough, fever   Onset/Duration: 2 wks  Symptoms:  Fever: YES- 101.2 yesterday  Chills/Sweats: YES- chills  Headache (location?): YES- bad  Sinus Pressure: YES- comes and goes  Conjunctivitis:  better  Ear Pain: YES: both, has gotten worse, jaw now hurts  Rhinorrhea: YES- on and off  Congestion: YES- on and off  Sore Throat: YES  Cough: YES - has gotten worse since throat has been more dry and scratchy  "('forcing me to cough')  Wheeze: no  Decreased Appetite: YES  Nausea: no  Vomiting: no  Diarrhea: YES- occasional due to augmentin maybe  Dysuria/Freq.: no  Dysuria or Hematuria: no  Fatigue/Achiness: YES- both, worse past couple of days  Sick/Strep Exposure: no  Therapies tried and outcome: ibuprofen, sudafed, augmentin    Has taken 7 days of Augmentin.    Not using any nasal sprays.    Review of Systems   C: NEGATIVE for fever, chills, change in weight  I: NEGATIVE for worrisome rashes, moles or lesions  E: NEGATIVE for vision changes or irritation  ENT/MOUTH: see above  RESP:as above  CV: NEGATIVE for chest pain, palpitations or peripheral edema  GI: NEGATIVE for nausea, abdominal pain, heartburn, or change in bowel habits  M: NEGATIVE for significant arthralgias or myalgia      Objective    /70   Pulse 99   Temp (!) 100.5  F (38.1  C) (Tympanic)   Resp 16   Ht 1.556 m (5' 1.25\")   Wt 51.3 kg (113 lb)   LMP 04/07/2022 (Approximate)   SpO2 99%   BMI 21.18 kg/m    Body mass index is 21.18 kg/m .  Physical Exam   GENERAL: alert and no distress  EYES: pink conjunctivae, no icterus  NECK: mild cervical adenopathy, nontender  LEFT EAR: EAM clear, tympanic membrane intact with no injection, mild effusion  RIGHT EAR: EAM clear, tympanic membrane mild injection on superior aspect, mild effusion  HEENT:  nose with moderate congestion, mild right maxillary sinus tenderness, throat not erythematous, no exudates, no oral ulcers  RESP: lungs clear to auscultation - no rales, no rhonchi, no wheezes  CV: regular rates and rhythm, normal S1 S2, no S3 or S4 and no murmur  SKIN:  Good turgor, no rashes    No results found for any visits on 04/26/22.        "

## 2022-04-26 NOTE — PATIENT INSTRUCTIONS
Stop augmentin.  Start lvofloxacin.  Start flonase nasal spray as prescribed.    Ibuprofen 200 mg 1-2 tabs with food every 8 hrs as needed for pain.

## 2022-05-21 ENCOUNTER — HEALTH MAINTENANCE LETTER (OUTPATIENT)
Age: 37
End: 2022-05-21

## 2022-09-18 ENCOUNTER — HEALTH MAINTENANCE LETTER (OUTPATIENT)
Age: 37
End: 2022-09-18

## 2022-10-05 ENCOUNTER — E-VISIT (OUTPATIENT)
Dept: FAMILY MEDICINE | Facility: CLINIC | Age: 37
End: 2022-10-05
Payer: COMMERCIAL

## 2022-10-05 DIAGNOSIS — N39.0 ACUTE UTI (URINARY TRACT INFECTION): Primary | ICD-10-CM

## 2022-10-05 PROCEDURE — 99421 OL DIG E/M SVC 5-10 MIN: CPT | Performed by: PHYSICIAN ASSISTANT

## 2022-10-05 RX ORDER — NITROFURANTOIN 25; 75 MG/1; MG/1
100 CAPSULE ORAL 2 TIMES DAILY
Qty: 10 CAPSULE | Refills: 0 | Status: SHIPPED | OUTPATIENT
Start: 2022-10-05 | End: 2022-10-10

## 2022-10-05 NOTE — PATIENT INSTRUCTIONS
Dear Aura Hope    After reviewing your responses, I've been able to diagnose you with a urinary tract infection, which is a common infection of the bladder with bacteria.  This is not a sexually transmitted infection, though urinating immediately after intercourse can help prevent infections.  Drinking lots of fluids is also helpful to clear your current infection and prevent the next one.      I have sent a prescription for antibiotics to your pharmacy to treat this infection.    It is important that you take all of your prescribed medication even if your symptoms are improving after a few doses.  Taking all of your medicine helps prevent the symptoms from returning.     If your symptoms worsen, you develop pain in your back or stomach, develop fevers, or are not improving in 5 days, please contact your primary care provider for an appointment or visit any of our convenient Walk-in or Urgent Care Centers to be seen, which can be found on our website here.    Thanks again for choosing us as your health care partner,    Oriana Avila PA-C    Urinary Tract Infections in Women  Urinary tract infections (UTIs) are most often caused by bacteria. These bacteria enter the urinary tract. The bacteria may come from inside the body. Or they may travel from the skin outside the rectum or vagina into the urethra. Female anatomy makes it easy for bacteria from the bowel to enter a woman s urinary tract, which is the most common source of UTI. This means women develop UTIs more often than men. Pain in or around the urinary tract is a common UTI symptom. But the only way to know for sure if you have a UTI for the healthcare provider to test your urine. The two tests that may be done are the urinalysis and urine culture.     Types of UTIs    Cystitis. A bladder infection (cystitis) is the most common UTI in women. You may have urgent or frequent need to pee. You may also have pain, burning when you pee, and bloody  urine.    Urethritis. This is an inflamed urethra, which is the tube that carries urine from the bladder to outside the body. You may have lower stomach or back pain. You may also have urgent or frequent need to pee.    Pyelonephritis. This is a kidney infection. If not treated, it can be serious and damage your kidneys. In severe cases, you may need to stay in the hospital. You may have a fever and lower back pain.    Medicines to treat a UTI  Most UTIs are treated with antibiotics. These kill the bacteria. The length of time you need to take them depends on the type of infection. It may be as short as 3 days. If you have repeated UTIs, you may need a low-dose antibiotic for several months. Take antibiotics exactly as directed. Don t stop taking them until all of the medicine is gone. If you stop taking the antibiotic too soon, the infection may not go away. You may also develop a resistance to the antibiotic. This can make it much harder to treat.   Lifestyle changes to treat and prevent UTIs   The lifestyle changes below will help get rid of your UTI. They may also help prevent future UTIs.     Drink plenty of fluids. This includes water, juice, or other caffeine-free drinks. Fluids help flush bacteria out of your body.    Empty your bladder. Always empty your bladder when you feel the urge to pee. And always pee before going to sleep. Urine that stays in your bladder can lead to infection. Try to pee before and after sex as well.    Practice good personal hygiene. Wipe yourself from front to back after using the toilet. This helps keep bacteria from getting into the urethra.    Use condoms during sex. These help prevent UTIs caused by sexually transmitted bacteria. Also don't use spermicides during sex. These can increase the risk for UTIs. Choose other forms of birth control instead. For women who tend to get UTIs after sex, a low-dose of a preventive antibiotic may be used. Be sure to discuss this option with  your healthcare provider.    Follow up with your healthcare provider as directed. He or she may test to make sure the infection has cleared. If needed, more treatment may be started.  Shameka last reviewed this educational content on 7/1/2019 2000-2021 [employer]. All rights reserved. This information is not intended as a substitute for professional medical care. Always follow your healthcare professional's instructions.

## 2023-06-04 ENCOUNTER — HEALTH MAINTENANCE LETTER (OUTPATIENT)
Age: 38
End: 2023-06-04

## 2023-06-06 ENCOUNTER — VIRTUAL VISIT (OUTPATIENT)
Dept: FAMILY MEDICINE | Facility: CLINIC | Age: 38
End: 2023-06-06
Payer: COMMERCIAL

## 2023-06-06 DIAGNOSIS — J02.9 PHARYNGITIS, UNSPECIFIED ETIOLOGY: Primary | ICD-10-CM

## 2023-06-06 PROCEDURE — 99213 OFFICE O/P EST LOW 20 MIN: CPT | Mod: VID | Performed by: PHYSICIAN ASSISTANT

## 2023-06-06 NOTE — PATIENT INSTRUCTIONS
Your symptoms are concerning for strep or viral sore throat/pharyngitis.  For further evaluation we are completing strep testing.  Please call your home clinic to schedule your test.  If your test is negative, then your symptoms are most likely viral and should improve on their own within 7-14 days.  For symptom relief you can use Tylenol/ibuprofen, salt water gargles, and rest.  Please see attached handout for additional information.  Reach out with questions or concerns.  Follow-up for any new or worsening symptoms.

## 2023-06-06 NOTE — PROGRESS NOTES
Aura is a 38 year old who is being evaluated via a billable video visit.      How would you like to obtain your AVS? MyChart  If the video visit is dropped, the invitation should be resent by: Text to cell phone: [phone]  Will anyone else be joining your video visit? No        Assessment & Plan     Pharyngitis, unspecified etiology  Patient is a 38-year-old female who presents to virtual visit due to persistent sore throat ongoing for 4 days. No known sick contacts. No other symptoms. Discussed possible etiology including viral and strep pharyngitis. Will complete strep testing. Recommended supportive measures with Tylenol/Ibuprofen, salt water gargles, and rest. Return precautions provided.  - Streptococcus A Rapid Screen w/Reflex to PCR - Clinic Collect     See Patient Instructions    Helen Aguilera PA-C  [employer] FARHAN Chavarria is a 38 year old, presenting for the following health issues:  Sick (Negative covid test this morning.)        6/6/2023     8:59 AM   Additional Questions   Roomed by Daniel CAOSTA   Accompanied by No one         6/6/2023     8:59 AM   Patient Reported Additional Medications   Patient reports taking the following new medications None     HPI     Acute Illness  Acute illness concerns: Sore Throat  Onset/Duration: 4 days  Symptoms:  Fever: No  Chills/Sweats: No  Headache (location?): No  Sinus Pressure: No  Conjunctivitis:  No  Ear Pain: no  Rhinorrhea: No  Congestion: No  Sore Throat: YES  Cough: no  Wheeze: No  Decreased Appetite: No  Nausea: No  Vomiting: No  Diarrhea: No  Dysuria/Freq.: No  Dysuria or Hematuria: No  Fatigue/Achiness: No  Sick/Strep Exposure: No  Therapies tried and outcome: None              Objective           Vitals:  No vitals were obtained today due to virtual visit.    Physical Exam   GENERAL: Healthy, alert and no distress  EYES: Eyes grossly normal to inspection.  No discharge or erythema, or obvious scleral/conjunctival  abnormalities.  RESP: No audible wheeze, cough, or visible cyanosis.  No visible retractions or increased work of breathing.    SKIN: Visible skin clear. No significant rash, abnormal pigmentation or lesions.  NEURO: Cranial nerves grossly intact.  Mentation and speech appropriate for age.  PSYCH: Mentation appears normal, affect normal/bright, judgement and insight intact, normal speech and appearance well-groomed.          Video-Visit Details    Type of service:  Video Visit     Originating Location (pt. Location): Home  Distant Location (provider location):  Off-site  Platform used for Video Visit: Subha

## 2023-06-07 ENCOUNTER — LAB (OUTPATIENT)
Dept: LAB | Facility: CLINIC | Age: 38
End: 2023-06-07
Attending: PHYSICIAN ASSISTANT
Payer: COMMERCIAL

## 2023-06-07 DIAGNOSIS — J02.9 PHARYNGITIS, UNSPECIFIED ETIOLOGY: ICD-10-CM

## 2023-06-07 LAB
DEPRECATED S PYO AG THROAT QL EIA: NEGATIVE
GROUP A STREP BY PCR: NOT DETECTED

## 2023-06-07 PROCEDURE — 87651 STREP A DNA AMP PROBE: CPT

## 2023-06-27 ENCOUNTER — MYC MEDICAL ADVICE (OUTPATIENT)
Dept: FAMILY MEDICINE | Facility: CLINIC | Age: 38
End: 2023-06-27
Payer: COMMERCIAL

## 2023-07-10 ENCOUNTER — E-VISIT (OUTPATIENT)
Dept: URGENT CARE | Facility: CLINIC | Age: 38
End: 2023-07-10
Payer: COMMERCIAL

## 2023-07-10 DIAGNOSIS — N39.0 ACUTE UTI (URINARY TRACT INFECTION): Primary | ICD-10-CM

## 2023-07-10 PROCEDURE — 99207 PR NO CHARGE LOS: CPT | Performed by: FAMILY MEDICINE

## 2023-07-10 RX ORDER — SULFAMETHOXAZOLE/TRIMETHOPRIM 800-160 MG
1 TABLET ORAL 2 TIMES DAILY
Qty: 6 TABLET | Refills: 0 | Status: SHIPPED | OUTPATIENT
Start: 2023-07-10 | End: 2023-07-13

## 2023-07-10 NOTE — PATIENT INSTRUCTIONS
Dear Aura Hope    Please contact your primary care provider regarding a long-term preventative plan.    After reviewing your responses, I've been able to diagnose you with a urinary tract infection, which is a common infection of the bladder with bacteria.  This is not a sexually transmitted infection, though urinating immediately after intercourse can help prevent infections.  Drinking lots of fluids is also helpful to clear your current infection and prevent the next one.      I have sent a prescription for antibiotics to your pharmacy to treat this infection.    It is important that you take all of your prescribed medication even if your symptoms are improving after a few doses.  Taking all of your medicine helps prevent the symptoms from returning.     If your symptoms worsen, you develop pain in your back or stomach, develop fevers, or are not improving in 5 days, please contact your primary care provider for an appointment or visit any of our convenient Walk-in or Urgent Care Centers to be seen, which can be found on our website here.    Thanks again for choosing us as your health care partner,    oLrna Higgins MD

## 2023-08-08 ENCOUNTER — E-VISIT (OUTPATIENT)
Dept: FAMILY MEDICINE | Facility: CLINIC | Age: 38
End: 2023-08-08
Payer: COMMERCIAL

## 2023-08-08 DIAGNOSIS — Z11.3 SCREEN FOR STD (SEXUALLY TRANSMITTED DISEASE): Primary | ICD-10-CM

## 2023-08-08 PROCEDURE — 99421 OL DIG E/M SVC 5-10 MIN: CPT | Performed by: FAMILY MEDICINE

## 2023-08-08 NOTE — PATIENT INSTRUCTIONS
Thank you for choosing us for your care. Given your symptoms, I would like you to do a lab-only visit to determine what is causing them.  I have placed the orders.  Please schedule an appointment with the lab right here in [place], or call [phone].  I will let you know when the results are back and next steps to take.

## 2023-08-10 ENCOUNTER — LAB (OUTPATIENT)
Dept: LAB | Facility: CLINIC | Age: 38
End: 2023-08-10
Payer: COMMERCIAL

## 2023-08-10 DIAGNOSIS — Z11.3 SCREEN FOR STD (SEXUALLY TRANSMITTED DISEASE): ICD-10-CM

## 2023-08-10 LAB
ALBUMIN UR-MCNC: NEGATIVE MG/DL
AMORPH CRY #/AREA URNS HPF: ABNORMAL /HPF
APPEARANCE UR: ABNORMAL
BACTERIA #/AREA URNS HPF: ABNORMAL /HPF
BILIRUB UR QL STRIP: NEGATIVE
COLOR UR AUTO: YELLOW
GLUCOSE UR STRIP-MCNC: NEGATIVE MG/DL
HGB UR QL STRIP: NEGATIVE
KETONES UR STRIP-MCNC: NEGATIVE MG/DL
LEUKOCYTE ESTERASE UR QL STRIP: NEGATIVE
MUCOUS THREADS #/AREA URNS LPF: PRESENT /LPF
NITRATE UR QL: NEGATIVE
PH UR STRIP: 6.5 [PH] (ref 5–7)
RBC #/AREA URNS AUTO: ABNORMAL /HPF
SP GR UR STRIP: 1.02 (ref 1–1.03)
SQUAMOUS #/AREA URNS AUTO: ABNORMAL /LPF
UROBILINOGEN UR STRIP-ACNC: 1 E.U./DL
WBC #/AREA URNS AUTO: ABNORMAL /HPF

## 2023-08-10 PROCEDURE — 86780 TREPONEMA PALLIDUM: CPT

## 2023-08-10 PROCEDURE — 87591 N.GONORRHOEAE DNA AMP PROB: CPT

## 2023-08-10 PROCEDURE — 86696 HERPES SIMPLEX TYPE 2 TEST: CPT

## 2023-08-10 PROCEDURE — 36415 COLL VENOUS BLD VENIPUNCTURE: CPT

## 2023-08-10 PROCEDURE — 81001 URINALYSIS AUTO W/SCOPE: CPT

## 2023-08-10 PROCEDURE — 87389 HIV-1 AG W/HIV-1&-2 AB AG IA: CPT

## 2023-08-10 PROCEDURE — 87491 CHLMYD TRACH DNA AMP PROBE: CPT

## 2023-08-10 PROCEDURE — 86695 HERPES SIMPLEX TYPE 1 TEST: CPT

## 2023-08-11 DIAGNOSIS — B00.9 HERPES SIMPLEX VIRUS INFECTION: Primary | ICD-10-CM

## 2023-08-11 LAB
C TRACH DNA SPEC QL NAA+PROBE: NEGATIVE
HIV 1+2 AB+HIV1 P24 AG SERPL QL IA: NONREACTIVE
HSV1 IGG SERPL QL IA: 40.4 INDEX
HSV1 IGG SERPL QL IA: ABNORMAL
HSV2 IGG SERPL QL IA: 2.44 INDEX
HSV2 IGG SERPL QL IA: ABNORMAL
N GONORRHOEA DNA SPEC QL NAA+PROBE: NEGATIVE
T PALLIDUM AB SER QL: NONREACTIVE

## 2023-08-11 RX ORDER — VALACYCLOVIR HYDROCHLORIDE 500 MG/1
500 TABLET, FILM COATED ORAL 2 TIMES DAILY
Qty: 6 TABLET | Refills: 6 | Status: SHIPPED | OUTPATIENT
Start: 2023-08-11 | End: 2023-12-13

## 2023-08-16 ENCOUNTER — OFFICE VISIT (OUTPATIENT)
Dept: FAMILY MEDICINE | Facility: CLINIC | Age: 38
End: 2023-08-16
Payer: COMMERCIAL

## 2023-08-16 ENCOUNTER — TELEPHONE (OUTPATIENT)
Dept: FAMILY MEDICINE | Facility: CLINIC | Age: 38
End: 2023-08-16

## 2023-08-16 VITALS
DIASTOLIC BLOOD PRESSURE: 80 MMHG | RESPIRATION RATE: 16 BRPM | HEIGHT: 61 IN | TEMPERATURE: 98 F | WEIGHT: 106 LBS | OXYGEN SATURATION: 100 % | BODY MASS INDEX: 20.01 KG/M2 | SYSTOLIC BLOOD PRESSURE: 126 MMHG | HEART RATE: 64 BPM

## 2023-08-16 DIAGNOSIS — N76.0 BV (BACTERIAL VAGINOSIS): Primary | ICD-10-CM

## 2023-08-16 DIAGNOSIS — B96.89 BV (BACTERIAL VAGINOSIS): Primary | ICD-10-CM

## 2023-08-16 LAB
ALBUMIN UR-MCNC: NEGATIVE MG/DL
APPEARANCE UR: CLEAR
BILIRUB UR QL STRIP: NEGATIVE
CLUE CELLS: PRESENT
COLOR UR AUTO: YELLOW
GLUCOSE UR STRIP-MCNC: NEGATIVE MG/DL
HCG UR QL: NEGATIVE
HGB UR QL STRIP: NEGATIVE
KETONES UR STRIP-MCNC: NEGATIVE MG/DL
LEUKOCYTE ESTERASE UR QL STRIP: NEGATIVE
NITRATE UR QL: NEGATIVE
PH UR STRIP: 6 [PH] (ref 5–7)
SP GR UR STRIP: >=1.03 (ref 1–1.03)
TRICHOMONAS, WET PREP: ABNORMAL
UROBILINOGEN UR STRIP-ACNC: 0.2 E.U./DL
WBC'S/HIGH POWER FIELD, WET PREP: ABNORMAL
YEAST, WET PREP: ABNORMAL

## 2023-08-16 PROCEDURE — 81003 URINALYSIS AUTO W/O SCOPE: CPT | Performed by: NURSE PRACTITIONER

## 2023-08-16 PROCEDURE — 87210 SMEAR WET MOUNT SALINE/INK: CPT | Performed by: NURSE PRACTITIONER

## 2023-08-16 PROCEDURE — 81025 URINE PREGNANCY TEST: CPT | Performed by: NURSE PRACTITIONER

## 2023-08-16 PROCEDURE — 99214 OFFICE O/P EST MOD 30 MIN: CPT | Performed by: NURSE PRACTITIONER

## 2023-08-16 RX ORDER — METRONIDAZOLE 500 MG/1
500 TABLET ORAL 2 TIMES DAILY
Qty: 14 TABLET | Refills: 0 | Status: SHIPPED | OUTPATIENT
Start: 2023-08-16 | End: 2023-08-23

## 2023-08-16 ASSESSMENT — PAIN SCALES - GENERAL: PAINLEVEL: NO PAIN (1)

## 2023-08-16 NOTE — TELEPHONE ENCOUNTER
Reason for Call:  Other call back    Detailed comments: patient called and states that UTI testing with E visit provider yesterday was neg.    Patient wants a re test done.  Doesn't feel accurate.    Still symptoms.    Please contact patient.  Thank you.    Phone Number Patient can be reached at: Home number on file [phone] (home)    Best Time: any    Can we leave a detailed message on this number? YES    Call taken on 8/16/2023 at 8:03 AM by Joselyn Bhatt

## 2023-08-16 NOTE — TELEPHONE ENCOUNTER
I spoke with pt.    She has appt today with Faustina Palomo at [facility] for another evaluation.    Will keep appt.    Lyubov Mason RN

## 2023-08-16 NOTE — PROGRESS NOTES
"  Assessment & Plan     BV (bacterial vaginosis)  UA negative, UPT negative, recent GC/chlamydia negative. Wet prep suggestive of BV. Start metronidazole. RTC if not improving.  - UA Macroscopic with reflex to Microscopic and Culture - Clinic Collect  - Wet prep - Clinic Collect  - HCG Qual, Urine ([id number])  - metroNIDAZOLE (FLAGYL) 500 MG tablet; Take 1 tablet (500 mg) by mouth 2 times daily for 7 days        See Patient Instructions    CHRISTINE Vogt CNP  [facility]    David Chavarria is a 38 year old, presenting for the following health issues:  Vaginal Problem        8/16/2023     1:10 PM   Additional Questions   Roomed by Poppy TORRES   Accompanied by self       History of Present Illness       Reason for visit:  Cramping, abdominal pain - UTI/bladder infection/ bacterial infection    She eats 2-3 servings of fruits and vegetables daily.She consumes 1 sweetened beverage(s) daily.She exercises with enough effort to increase her heart rate 10 to 19 minutes per day.  She exercises with enough effort to increase her heart rate 3 or less days per week.   She is taking medications regularly.       Above HPI reviewed. Additionally, couple weeks of pelvic pressure, vaginal odor. Evisit a few days ago with negative UA, negative GC/chlamydia, HIV and syphilis testing. Increased frequency of urination as well. No fevers. No back or flank pain. No vaginal bleeding.       Review of Systems   Constitutional, HEENT, cardiovascular, pulmonary, gi and gu systems are negative, except as otherwise noted.      Objective    /80   Pulse 64   Temp 98  F (36.7  C) (Tympanic)   Resp 16   Ht 1.556 m (5' 1.25\")   Wt 48.1 kg (106 lb)   SpO2 100%   BMI 19.87 kg/m    Body mass index is 19.87 kg/m .  Physical Exam  Vitals and nursing note reviewed.   Constitutional:       General: She is not in acute distress.     Appearance: Normal appearance.   HENT:      Head: Normocephalic and atraumatic. "      Mouth/Throat:      Mouth: Mucous membranes are moist.   Cardiovascular:      Rate and Rhythm: Normal rate.   Pulmonary:      Effort: Pulmonary effort is normal.   Musculoskeletal:      Cervical back: Neck supple.   Skin:     General: Skin is warm and dry.   Neurological:      General: No focal deficit present.      Mental Status: She is alert.   Psychiatric:         Mood and Affect: Mood normal.         Behavior: Behavior normal.            Results for orders placed or performed in visit on 08/16/23 (from the past 24 hour(s))   UA Macroscopic with reflex to Microscopic and Culture - Clinic Collect    Specimen: Urine, Clean Catch   Result Value Ref Range    Color Urine Yellow Colorless, Straw, Light Yellow, Yellow    Appearance Urine Clear Clear    Glucose Urine Negative Negative mg/dL    Bilirubin Urine Negative Negative    Ketones Urine Negative Negative mg/dL    Specific Gravity Urine >=1.030 1.003 - 1.035    Blood Urine Negative Negative    pH Urine 6.0 5.0 - 7.0    Protein Albumin Urine Negative Negative mg/dL    Urobilinogen Urine 0.2 0.2, 1.0 E.U./dL    Nitrite Urine Negative Negative    Leukocyte Esterase Urine Negative Negative    Narrative    Microscopic not indicated   Wet prep - Clinic Collect    Specimen: Vagina; Swab   Result Value Ref Range    Trichomonas Absent Absent    Yeast Absent Absent    Clue Cells Present (A) Absent    WBCs/high power field 2+ (A) None   HCG Qual, Urine ([id number])   Result Value Ref Range    hCG Urine Qualitative Negative Negative

## 2023-08-16 NOTE — PATIENT INSTRUCTIONS
You have bacterial vaginosis.    See below for more information regarding bacterial vaginosis.    Take metronidazole as prescribed x 7 days.  Do not drink any alcohol while taking this medication or for 7 days after completing it as it can make you very ill. Take a probiotic while on the antibiotic.    Follow up with primary care provider if no improvement or worsening in 1 week.      Bacterial Vaginosis  Bacterial vaginosis is a vaginal infection that occurs when the normal balance of bacteria in the vagina is disrupted. It results from an overgrowth of certain bacteria. This is the most common vaginal infection in women of childbearing age. Treatment is important to prevent complications, especially in pregnant women, as it can cause a premature delivery.  CAUSES   Bacterial vaginosis is caused by an increase in harmful bacteria that are normally present in smaller amounts in the vagina. Several different kinds of bacteria can cause bacterial vaginosis. However, the reason that the condition develops is not fully understood.  RISK FACTORS  Certain activities or behaviors can put you at an increased risk of developing bacterial vaginosis, including:  Having a new sex partner or multiple sex partners.  Douching.  Using an intrauterine device (IUD) for contraception.  Women do not get bacterial vaginosis from toilet seats, bedding, swimming pools, or contact with objects around them.  SIGNS AND SYMPTOMS   Some women with bacterial vaginosis have no signs or symptoms. Common symptoms include:  Grey vaginal discharge.  A fishlike odor with discharge, especially after sexual intercourse.  Itching or burning of the vagina and vulva.  Burning or pain with urination.  DIAGNOSIS   Your health care provider will take a medical history and examine the vagina for signs of bacterial vaginosis. A sample of vaginal fluid may be taken. Your health care provider will look at this sample under a microscope to check for bacteria and  abnormal cells. A vaginal pH test may also be done.   TREATMENT   Bacterial vaginosis may be treated with antibiotic medicines. These may be given in the form of a pill or a vaginal cream. A second round of antibiotics may be prescribed if the condition comes back after treatment. Because bacterial vaginosis increases your risk for sexually transmitted diseases, getting treated can help reduce your risk for chlamydia, gonorrhea, HIV, and herpes.  HOME CARE INSTRUCTIONS   Only take over-the-counter or prescription medicines as directed by your health care provider.  If antibiotic medicine was prescribed, take it as directed. Make sure you finish it even if you start to feel better.  Tell all sexual partners that you have a vaginal infection. They should see their health care provider and be treated if they have problems, such as a mild rash or itching.  During treatment, it is important that you follow these instructions:  Avoid sexual activity or use condoms correctly.  Do not douche.  Avoid alcohol as directed by your health care provider.  Avoid breastfeeding as directed by your health care provider.  SEEK MEDICAL CARE IF:   Your symptoms are not improving after 3 days of treatment.  You have increased discharge or pain.  You have a fever.  MAKE SURE YOU:   Understand these instructions.  Will watch your condition.  Will get help right away if you are not doing well or get worse.  FOR MORE INFORMATION   Centers for Disease Control and Prevention, Division of STD Prevention: www.cdc.gov/std  American Sexual Health Association (CHANCE): www.ashastd.org      This information is not intended to replace advice given to you by your health care provider. Make sure you discuss any questions you have with your health care provider.     Document Released: 12/18/2006 Document Revised: 10/06/2015 Document Reviewed: 07/30/2014  [employer]Care  Patient Information  2015 [employer].

## 2023-10-30 ENCOUNTER — TELEPHONE (OUTPATIENT)
Dept: FAMILY MEDICINE | Facility: CLINIC | Age: 38
End: 2023-10-30
Payer: COMMERCIAL

## 2023-10-30 NOTE — TELEPHONE ENCOUNTER
Please call patient and ask for clarification.  Patient had an evisit on 8/8/23 for STD screen using urine tests.  Unfortunately, STD screens require both urine and blood test.  As result of the blood test, we were able to determine she was exposed to herpes in the past.  This can not be determined with a urine test.
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
## 2023-10-30 NOTE — TELEPHONE ENCOUNTER
General Call    Contacts         Type Contact Phone/Fax    10/30/2023 12:58 PM CDT Phone (Incoming) Aura Hope (Self) [phone] (H)          Reason for Call:  pt had labs done on 8/10/23 relating to e visit with Dr. Arita on 8/8/23,     What are your questions or concerns:  pt had labs done on 8/10/23 relating to e visit with Dr. Arita on 8/8/23, pt only wanted UA test, but had blood draw also, pt wants labs from 8/10, 23 removed from her my chart    Date of last appointment with provider: 8/8/23    Could we send this information to you in [employer] or would you prefer to receive a phone call?:   Patient would prefer a phone call   Okay to leave a detailed message?: Yes at Cell number on file:    Telephone Information:   Mobile [phone]

## 2023-11-01 NOTE — TELEPHONE ENCOUNTER
Agree with changing her [employer] login or deactivating her [employer] access if there is any security issues.  Unfortunately, we can not remove any accurate information from her chart.  Patient needs to connect with HIM if she would like any corrections in her chart [phone].

## 2023-11-01 NOTE — TELEPHONE ENCOUNTER
Called [phone] (home)   Did they answer the phone: No, left a message on voicemail to return call to the [facility]] at [phone], and to ask for any available triage nurse.    Zoraida RN BSN  Triage Nurse  [facility]

## 2023-11-02 NOTE — TELEPHONE ENCOUNTER
Called patient. Did they answer the phone: No, left a message on voicemail to return call to the [facility] at [phone], and to ask for any available triage nurse.    Zoraida RN BSN  Triage Nurse  [facility]

## 2023-11-03 ENCOUNTER — MYC MEDICAL ADVICE (OUTPATIENT)
Dept: FAMILY MEDICINE | Facility: CLINIC | Age: 38
End: 2023-11-03
Payer: COMMERCIAL

## 2023-11-03 NOTE — TELEPHONE ENCOUNTER
See TE below; patient returning call stating number was disconnected ([phone]).    Gave patient alternative number regarding concerns for patient care (patient needs information removed from [employer]); informed to call [phone].    Patient verbalized understanding.    Cathy Cox RN on 11/3/2023 at 2:50 PM

## 2023-11-03 NOTE — TELEPHONE ENCOUNTER
Patient called about mychart. I informed her of message and read to pt. Pt verbalized understanding and will be calling HIM to see about getting test results removed as she does not want this in her chart.     Thanks,  EFRAIN Gurrola  [facility]

## 2023-11-03 NOTE — TELEPHONE ENCOUNTER
Called patient. Did they answer the phone: No, left a message on voicemail to return call to the [facility] at [phone], and to ask for any available triage nurse.    Sent information below per Dr. Arita, per her mychart.    Zoraida ZUNIGA BSN  Triage Nurse  [facility]

## 2023-12-01 ENCOUNTER — LAB (OUTPATIENT)
Dept: LAB | Facility: CLINIC | Age: 38
End: 2023-12-01
Payer: MEDICAID

## 2023-12-01 ENCOUNTER — E-VISIT (OUTPATIENT)
Dept: URGENT CARE | Facility: CLINIC | Age: 38
End: 2023-12-01
Payer: MEDICAID

## 2023-12-01 DIAGNOSIS — R30.0 DYSURIA: ICD-10-CM

## 2023-12-01 DIAGNOSIS — N30.01 ACUTE CYSTITIS WITH HEMATURIA: Primary | ICD-10-CM

## 2023-12-01 DIAGNOSIS — R30.0 DYSURIA: Primary | ICD-10-CM

## 2023-12-01 LAB
ALBUMIN UR-MCNC: 30 MG/DL
APPEARANCE UR: ABNORMAL
BACTERIA #/AREA URNS HPF: ABNORMAL /HPF
BILIRUB UR QL STRIP: NEGATIVE
COLOR UR AUTO: YELLOW
GLUCOSE UR STRIP-MCNC: NEGATIVE MG/DL
HGB UR QL STRIP: ABNORMAL
KETONES UR STRIP-MCNC: NEGATIVE MG/DL
LEUKOCYTE ESTERASE UR QL STRIP: ABNORMAL
NITRATE UR QL: NEGATIVE
PH UR STRIP: 6.5 [PH] (ref 5–7)
RBC #/AREA URNS AUTO: ABNORMAL /HPF
SP GR UR STRIP: 1.02 (ref 1–1.03)
SQUAMOUS #/AREA URNS AUTO: ABNORMAL /LPF
UROBILINOGEN UR STRIP-ACNC: 0.2 E.U./DL
WBC #/AREA URNS AUTO: ABNORMAL /HPF

## 2023-12-01 PROCEDURE — 81001 URINALYSIS AUTO W/SCOPE: CPT

## 2023-12-01 PROCEDURE — 87086 URINE CULTURE/COLONY COUNT: CPT

## 2023-12-01 PROCEDURE — 87186 SC STD MICRODIL/AGAR DIL: CPT

## 2023-12-01 PROCEDURE — 99421 OL DIG E/M SVC 5-10 MIN: CPT | Performed by: PHYSICIAN ASSISTANT

## 2023-12-01 RX ORDER — NITROFURANTOIN 25; 75 MG/1; MG/1
100 CAPSULE ORAL 2 TIMES DAILY
Qty: 10 CAPSULE | Refills: 0 | Status: SHIPPED | OUTPATIENT
Start: 2023-12-01 | End: 2023-12-06

## 2023-12-01 NOTE — PATIENT INSTRUCTIONS
Dear Aura Hope,     After reviewing your responses, I would like you to come in for a urine test to make sure we treat you correctly. This urine test is to evaluate you for a possible urinary tract infection, and should be scheduled for today or tomorrow. Schedule a Lab Only appointment here.     Lab appointments are not available at most locations on the weekends. If no Lab Only appointment is available, you should be seen in any of our convenient Walk-in or Urgent Care Centers, which can be found on our website here.     You will receive instructions with your results in [employer] once they are available.     If your symptoms worsen, you develop pain in your back or stomach, develop fevers, or are not improving in 5 days, please contact your primary care provider for an appointment or visit a Walk-in or Urgent Care Center to be seen.     Thanks again for choosing us as your health care partner,     Ryanne Bradley PA-C

## 2023-12-02 LAB — BACTERIA UR CULT: ABNORMAL

## 2023-12-07 ENCOUNTER — TELEPHONE (OUTPATIENT)
Dept: FAMILY MEDICINE | Facility: CLINIC | Age: 38
End: 2023-12-07
Payer: MEDICAID

## 2023-12-07 NOTE — TELEPHONE ENCOUNTER
Pt missed 2 doses of antibiotic and still having the same symptoms, recent evisit with you. please advise  Siobhan Ramirez RN on 12/7/2023 at 11:49 AM

## 2023-12-07 NOTE — TELEPHONE ENCOUNTER
Ryanne Bradley PA-C Barrios, Becky, RN1 hour ago (12:49 PM)     Call patient and let them know they need to be seen in person in the clinic. Patient can go to the [facility] clinic  Ryanne Bradley PA-C

## 2023-12-13 ENCOUNTER — OFFICE VISIT (OUTPATIENT)
Dept: OBGYN | Facility: CLINIC | Age: 38
End: 2023-12-13
Payer: MEDICAID

## 2023-12-13 VITALS
SYSTOLIC BLOOD PRESSURE: 125 MMHG | DIASTOLIC BLOOD PRESSURE: 76 MMHG | BODY MASS INDEX: 21.46 KG/M2 | WEIGHT: 116.6 LBS | RESPIRATION RATE: 16 BRPM | TEMPERATURE: 97.7 F | HEART RATE: 82 BPM | HEIGHT: 62 IN

## 2023-12-13 DIAGNOSIS — R39.15 URINARY URGENCY: ICD-10-CM

## 2023-12-13 DIAGNOSIS — Z31.69 ENCOUNTER FOR PRECONCEPTION CONSULTATION: ICD-10-CM

## 2023-12-13 DIAGNOSIS — Z00.00 ANNUAL PHYSICAL EXAM: ICD-10-CM

## 2023-12-13 DIAGNOSIS — N92.6 IRREGULAR PERIODS: Primary | ICD-10-CM

## 2023-12-13 LAB
ALBUMIN UR-MCNC: NEGATIVE MG/DL
APPEARANCE UR: CLEAR
BACTERIA #/AREA URNS HPF: ABNORMAL /HPF
BILIRUB UR QL STRIP: NEGATIVE
COLOR UR AUTO: YELLOW
GLUCOSE UR STRIP-MCNC: NEGATIVE MG/DL
HCG UR QL: NEGATIVE
HGB UR QL STRIP: NEGATIVE
INTERNAL QC OK POCT: NORMAL
KETONES UR STRIP-MCNC: ABNORMAL MG/DL
LEUKOCYTE ESTERASE UR QL STRIP: NEGATIVE
NITRATE UR QL: NEGATIVE
PH UR STRIP: 6.5 [PH] (ref 5–7)
POCT KIT EXPIRATION DATE: NORMAL
POCT KIT LOT NUMBER: NORMAL
RBC #/AREA URNS AUTO: ABNORMAL /HPF
SP GR UR STRIP: 1.02 (ref 1–1.03)
SQUAMOUS #/AREA URNS AUTO: ABNORMAL /LPF
UROBILINOGEN UR STRIP-ACNC: 0.2 E.U./DL
WBC #/AREA URNS AUTO: ABNORMAL /HPF

## 2023-12-13 PROCEDURE — 81001 URINALYSIS AUTO W/SCOPE: CPT | Performed by: ADVANCED PRACTICE MIDWIFE

## 2023-12-13 PROCEDURE — 99213 OFFICE O/P EST LOW 20 MIN: CPT | Mod: 25 | Performed by: ADVANCED PRACTICE MIDWIFE

## 2023-12-13 PROCEDURE — 87088 URINE BACTERIA CULTURE: CPT | Performed by: ADVANCED PRACTICE MIDWIFE

## 2023-12-13 PROCEDURE — 87086 URINE CULTURE/COLONY COUNT: CPT | Performed by: ADVANCED PRACTICE MIDWIFE

## 2023-12-13 PROCEDURE — 81025 URINE PREGNANCY TEST: CPT | Performed by: ADVANCED PRACTICE MIDWIFE

## 2023-12-13 PROCEDURE — 99385 PREV VISIT NEW AGE 18-39: CPT | Performed by: ADVANCED PRACTICE MIDWIFE

## 2023-12-13 RX ORDER — VALACYCLOVIR HYDROCHLORIDE 1 G/1
TABLET, FILM COATED ORAL
COMMUNITY
Start: 2023-07-31 | End: 2024-02-06

## 2023-12-13 RX ORDER — NITROFURANTOIN MACROCRYSTAL 100 MG
100 CAPSULE ORAL 2 TIMES DAILY
COMMUNITY
End: 2024-02-06

## 2023-12-13 ASSESSMENT — ENCOUNTER SYMPTOMS
NAUSEA: 0
CHILLS: 0
CONSTIPATION: 0
ARTHRALGIAS: 0
WEAKNESS: 0
DIARRHEA: 0
ABDOMINAL PAIN: 1
HEMATOCHEZIA: 0
PALPITATIONS: 0
FREQUENCY: 1
COUGH: 0
DYSURIA: 0
SORE THROAT: 0
JOINT SWELLING: 0
SHORTNESS OF BREATH: 0
PARESTHESIAS: 0
EYE PAIN: 0
FEVER: 0
HEARTBURN: 0
HEADACHES: 0
MYALGIAS: 1
DIZZINESS: 0
NERVOUS/ANXIOUS: 0
HEMATURIA: 0

## 2023-12-13 NOTE — PROGRESS NOTES
SUBJECTIVE:   CC: Aura Hope is an 38 year old woman who presents for preventive health visit. She and her partner have been trying to get pregnant for 10-12 months.  She also is being treated for a UTI and still has urgency.  She has taking another medication in the past for 7 days.  She would like pregnancy test as her period is late.She got pregnant in the past with Clomid.      Patient has been advised of split billing requirements and indicates understanding: Yes  Answers submitted by the patient for this visit:  Annual Preventive Visit (Submitted on 12/13/2023)  Chief Complaint: Annual Exam:  Frequency of exercise:: 4-5 days/week  Getting at least 3 servings of Calcium per day:: Yes  Diet:: Regular (no restrictions)  Taking medications regularly:: Yes  Bi-annual eye exam:: Yes  Dental care twice a year:: Yes  Sleep apnea or symptoms of sleep apnea:: None  abdominal pain: Yes  Blood in stool: No  Blood in urine: No  chest pain: No  chills: No  congestion: No  constipation: No  cough: No  diarrhea: No  dizziness: No  ear pain: No  eye pain: No  nervous/anxious: No  fever: No  frequency: Yes  genital sores: No  headaches: No  hearing loss: No  heartburn: No  arthralgias: No  joint swelling: No  peripheral edema: No  mood changes: Yes  myalgias: Yes  nausea: No  dysuria: No  palpitations: No  Skin sensation changes: No  sore throat: No  urgency: Yes  rash: No  shortness of breath: No  visual disturbance: No  weakness: No  Additional concerns today:: Yes  Exercise outside of work (Submitted on 12/13/2023)  Chief Complaint: Annual Exam:  Duration of exercise:: 30-45 minutes      Today's PHQ-2 Score:       6/6/2023    10:07 AM 4/19/2022     1:51 PM   PHQ-2 ( 1999 [employer])   Q1: Little interest or pleasure in doing things 0 1   Q2: Feeling down, depressed or hopeless 0 1   PHQ-2 Score 0 2   Q1: Little interest or pleasure in doing things Not at all Several days   Q2: Feeling down, depressed or hopeless Not at all  Several days   PHQ-2 Score 0 2       Abuse: Current or Past(Physical, Sexual or Emotional)- No  Do you feel safe in your environment? Yes      Social History     Tobacco Use    Smoking status: Never    Smokeless tobacco: Never   Substance Use Topics    Alcohol use: Yes     Comment: 0-1 drink per week                        Reviewed orders with patient.  Reviewed health maintenance and updated orders accordingly - Yes  Labs reviewed in EPIC    FHS-7:        No data to display                Patient under 40 years of age: Routine Mammogram Screening not recommended.   Pertinent mammograms are reviewed under the imaging tab.    Pertinent mammograms are reviewed under the imaging tab.  History of abnormal Pap smear: NO - age 30-65 PAP every 5 years with negative HPV co-testing recommended      8/27/2019    12:00 AM   PAP / HPV   HPV_EXT - HISTORICAL See Scanned Report    PAP-ABSTRACT See Scanned Document           This result is from an external source.     Reviewed and updated as needed this visit by clinical staff                  Reviewed and updated as needed this visit by Provider                 Past Medical History:   Diagnosis Date    Abnormal Pap smear of cervix 08/01/2019    Acute headache     Anxiety     Classic migraine     Endometriosis     Genital herpes     Hypothyroidism         ROS:  CONSTITUTIONAL: NEGATIVE for fever, chills, change in weight  INTEGUMENTARU/SKIN: NEGATIVE for worrisome rashes, moles or lesions  EYES: NEGATIVE for vision changes or irritation  ENT: NEGATIVE for ear, mouth and throat problems  RESP: NEGATIVE for significant cough or SOB  BREAST: NEGATIVE for masses, tenderness or discharge  CV: NEGATIVE for chest pain, palpitations or peripheral edema  GI: NEGATIVE for nausea, abdominal pain, heartburn, or change in bowel habits  : NEGATIVE for unusual vaginal symptoms. Periods are irregular. Still has urinary urgency since being treated for a UTI.   MUSCULOSKELETAL: NEGATIVE for  "significant arthralgias or myalgia  NEURO: NEGATIVE for weakness, dizziness or paresthesias  PSYCHIATRIC: NEGATIVE for changes in mood or affect    OBJECTIVE:   /76 (BP Location: Left arm, Patient Position: Sitting, Cuff Size: Adult Regular)   Pulse 82   Temp 97.7  F (36.5  C)   Resp 16   Ht 1.575 m (5' 2\")   Wt 52.9 kg (116 lb 9.6 oz)   LMP 11/08/2023 (Approximate)   BMI 21.33 kg/m    EXAM:  GENERAL: healthy, alert and no distress  EYES: Eyes grossly normal to inspection, PERRL and conjunctivae and sclerae normal  HENT: ear canals and TM's normal, nose and mouth without ulcers or lesions  NECK: no adenopathy, no asymmetry, masses, or scars and thyroid normal to palpation  RESP: lungs clear to auscultation - no rales, rhonchi or wheezes  BREAST: normal without masses, tenderness or nipple discharge and no palpable axillary masses or adenopathy  CV: regular rate and rhythm, normal S1 S2, no S3 or S4, no murmur, click or rub, no peripheral edema and peripheral pulses strong  ABDOMEN: soft, nontender, no hepatosplenomegaly, no masses and bowel sounds normal   (female): normal female external genitalia, normal urethral meatus, vaginal mucosa pink, moist, well rugated, and normal cervix/adnexa/uterus without masses or discharge  MS: no gross musculoskeletal defects noted, no edema  SKIN: no suspicious lesions or rashes  NEURO: Normal strength and tone, mentation intact and speech normal  PSYCH: mentation appears normal, affect normal/bright    Labs reviewed in Epic    ASSESSMENT/PLAN:   (N92.6) Irregular periods  (primary encounter diagnosis)  Plan: HCG qualitative urine POCT       Negative pregnancy test     (N39.0) Urinary tract infection  Plan: Urine Culture, UA with Microscopic, Urine         Microscopic Exam  UA result reviewed - much improved.  UC pending.  We discussed comfort measures. Can try AZO.  Drink more water, less irritants.  Call if symptoms continue or worsen.      (Z00.00) Annual " "physical exam  Plan: Return to clinic  yearly     (Z31.69) Encounter for preconception consultation  Plan: Discussed prenatal vits, abstain from alcohol and drugs. Try to track cycles, can try ovulation kits, watch interest in sex/babies.  I recommend seeing OB/Gyn MD to discuss further and assess for medication.      Patient has been advised of split billing requirements and indicates understanding: Yes  COUNSELING:   Special attention given to:        Regular exercise       Healthy diet/nutrition       Family planning    Estimated body mass index is 19.87 kg/m  as calculated from the following:    Height as of 8/16/23: 1.556 m (5' 1.25\").    Weight as of 8/16/23: 48.1 kg (106 lb).        She reports that she has never smoked. She has never used smokeless tobacco.      Counseling Resources:  ATP IV Guidelines  Pooled Cohorts Equation Calculator  Breast Cancer Risk Calculator  BRCA-Related Cancer Risk Assessment: FHS-7 Tool  FRAX Risk Assessment  ICSI Preventive Guidelines  Dietary Guidelines for Americans, 2010  USDA's MyPlate  ASA Prophylaxis  Lung CA Screening    Nargis Garcia CNM  [facility]  "

## 2023-12-14 ENCOUNTER — MYC MEDICAL ADVICE (OUTPATIENT)
Dept: OBGYN | Facility: CLINIC | Age: 38
End: 2023-12-14
Payer: MEDICAID

## 2023-12-14 DIAGNOSIS — N30.90 INFECTION OF BLADDER: Primary | ICD-10-CM

## 2023-12-14 LAB
BACTERIA UR CULT: ABNORMAL
BACTERIA UR CULT: ABNORMAL

## 2023-12-14 RX ORDER — AMPICILLIN TRIHYDRATE 500 MG
500 CAPSULE ORAL 4 TIMES DAILY
Qty: 28 CAPSULE | Refills: 0 | Status: SHIPPED | OUTPATIENT
Start: 2023-12-14 | End: 2023-12-18

## 2023-12-18 ENCOUNTER — TELEPHONE (OUTPATIENT)
Dept: MIDWIFE SERVICES | Facility: CLINIC | Age: 38
End: 2023-12-18
Payer: MEDICAID

## 2023-12-18 DIAGNOSIS — N30.90 INFECTION OF BLADDER: ICD-10-CM

## 2023-12-18 RX ORDER — AMPICILLIN TRIHYDRATE 500 MG
500 CAPSULE ORAL 4 TIMES DAILY
Qty: 28 CAPSULE | Refills: 0 | Status: SHIPPED | OUTPATIENT
Start: 2023-12-18 | End: 2024-02-06

## 2023-12-18 RX ORDER — AMPICILLIN TRIHYDRATE 500 MG
500 CAPSULE ORAL 4 TIMES DAILY
Qty: 28 CAPSULE | Refills: 0 | Status: SHIPPED | OUTPATIENT
Start: 2023-12-18 | End: 2023-12-18

## 2023-12-18 NOTE — TELEPHONE ENCOUNTER
Patient calls for Rx transfer.   Ampicillin prescribed by OB/GYN on 12/14/23 and sent to [facility]; however, patient never picked this up and communicates that preferred pharmacy is [facility].   RN sees active Rx, so transferred to [facility] per patient request.    Latasha Campuzano RN  [facility]

## 2023-12-27 ENCOUNTER — MYC MEDICAL ADVICE (OUTPATIENT)
Dept: FAMILY MEDICINE | Facility: CLINIC | Age: 38
End: 2023-12-27
Payer: MEDICAID

## 2024-01-06 ENCOUNTER — NURSE TRIAGE (OUTPATIENT)
Dept: NURSING | Facility: CLINIC | Age: 39
End: 2024-01-06

## 2024-01-06 ENCOUNTER — E-VISIT (OUTPATIENT)
Dept: URGENT CARE | Facility: CLINIC | Age: 39
End: 2024-01-06
Payer: COMMERCIAL

## 2024-01-06 ENCOUNTER — LAB (OUTPATIENT)
Dept: LAB | Facility: CLINIC | Age: 39
End: 2024-01-06
Payer: COMMERCIAL

## 2024-01-06 ENCOUNTER — MYC REFILL (OUTPATIENT)
Dept: URGENT CARE | Facility: CLINIC | Age: 39
End: 2024-01-06

## 2024-01-06 DIAGNOSIS — N39.0 URINARY TRACT INFECTION WITHOUT HEMATURIA, SITE UNSPECIFIED: ICD-10-CM

## 2024-01-06 DIAGNOSIS — R30.0 DYSURIA: Primary | ICD-10-CM

## 2024-01-06 DIAGNOSIS — R30.0 DYSURIA: ICD-10-CM

## 2024-01-06 LAB
ALBUMIN UR-MCNC: NEGATIVE MG/DL
APPEARANCE UR: CLEAR
BACTERIA #/AREA URNS HPF: ABNORMAL /HPF
BILIRUB UR QL STRIP: NEGATIVE
COLOR UR AUTO: YELLOW
GLUCOSE UR STRIP-MCNC: NEGATIVE MG/DL
HGB UR QL STRIP: ABNORMAL
KETONES UR STRIP-MCNC: NEGATIVE MG/DL
LEUKOCYTE ESTERASE UR QL STRIP: ABNORMAL
MUCOUS THREADS #/AREA URNS LPF: PRESENT /LPF
NITRATE UR QL: NEGATIVE
PH UR STRIP: 5 [PH] (ref 5–7)
RBC #/AREA URNS AUTO: ABNORMAL /HPF
SP GR UR STRIP: 1.02 (ref 1–1.03)
SQUAMOUS #/AREA URNS AUTO: ABNORMAL /LPF
UROBILINOGEN UR STRIP-ACNC: 0.2 E.U./DL
WBC #/AREA URNS AUTO: ABNORMAL /HPF
WBC CLUMPS #/AREA URNS HPF: PRESENT /HPF

## 2024-01-06 PROCEDURE — 87088 URINE BACTERIA CULTURE: CPT

## 2024-01-06 PROCEDURE — 87086 URINE CULTURE/COLONY COUNT: CPT

## 2024-01-06 PROCEDURE — 99421 OL DIG E/M SVC 5-10 MIN: CPT | Performed by: PHYSICIAN ASSISTANT

## 2024-01-06 PROCEDURE — 81001 URINALYSIS AUTO W/SCOPE: CPT

## 2024-01-06 RX ORDER — NITROFURANTOIN 25; 75 MG/1; MG/1
100 CAPSULE ORAL 2 TIMES DAILY
Qty: 10 CAPSULE | Refills: 0 | Status: SHIPPED | OUTPATIENT
Start: 2024-01-06 | End: 2024-01-11

## 2024-01-06 RX ORDER — NITROFURANTOIN 25; 75 MG/1; MG/1
100 CAPSULE ORAL 2 TIMES DAILY
Qty: 10 CAPSULE | Refills: 0 | Status: SHIPPED | OUTPATIENT
Start: 2024-01-06 | End: 2024-01-06

## 2024-01-06 NOTE — PATIENT INSTRUCTIONS
Dear Aura Hope,     After reviewing your responses, I would like you to come in for a urine test to make sure we treat you correctly. This urine test is to evaluate you for a possible urinary tract infection, and should be scheduled for today or tomorrow. Schedule a Lab Only appointment here.     Lab appointments are not available at most locations on the weekends. If no Lab Only appointment is available, you should be seen in any of our convenient Walk-in or Urgent Care Centers, which can be found on our website here.     You will receive instructions with your results in [email or web address] once they are available.     If your symptoms worsen, you develop pain in your back or stomach, develop fevers, or are not improving in 5 days, please contact your primary care provider for an appointment or visit a Walk-in or Urgent Care Center to be seen.     Thanks again for choosing us as your health care partner,     Ryanne Bradley PA-C

## 2024-01-06 NOTE — TELEPHONE ENCOUNTER
Nurse Triage SBAR    Situation: Medication question    Background: Patient calling. Patient did a evisit today and prescribed Macrobid. Patients pharmacy is closed.     Assessment: Patient is calling to request the Macrobid to be sent to an open pharmacy.     Recommendation: RN was able to send the medication to the new pharmacy.     Leena Chandler RN Nursing Advisor 1/6/2024 5:02 PM     Reason for Disposition   [1] Prescription prescribed recently is not at pharmacy AND [2] triager has access to patient's EMR AND [3] prescription is recorded in the EMR    Additional Information   Negative: New-onset or worsening symptoms, see that guideline (e.g., diarrhea, runny nose, sore throat)   Negative: Medicine question not related to refill or renewal   Negative: Caller (e.g., patient or pharmacist) requesting information about a new medicine   Negative: Caller requesting information unrelated to medicine   Negative: [1] Prescription refill request for ESSENTIAL medicine (i.e., likelihood of harm to patient if not taken) AND [2] triager unable to refill per department policy   Negative: [1] Prescription not at pharmacy AND [2] was prescribed by PCP recently  (Exception: Triager has access to EMR and prescription is recorded there. Go to Home Care and confirm for pharmacy.)   Negative: [1] Pharmacy calling with prescription questions AND [2] triager unable to answer question   Negative: Prescription request for new medicine (not a refill)   Negative: Caller requesting a CONTROLLED substance prescription refill (e.g., narcotics, ADHD medicines)   Negative: [1] Prescription refill request for NON-ESSENTIAL medicine (i.e., no harm to patient if med not taken) AND [2] triager unable to refill per department policy   Negative: [1] Caller has NON-URGENT medicine question about med that PCP prescribed AND [2] triager unable to answer question    Protocols used: Medication Refill and Renewal Call-A-

## 2024-01-08 DIAGNOSIS — B95.1 GROUP B STREPTOCOCCAL UTI: Primary | ICD-10-CM

## 2024-01-08 DIAGNOSIS — N39.0 GROUP B STREPTOCOCCAL UTI: Primary | ICD-10-CM

## 2024-01-08 LAB
BACTERIA UR CULT: ABNORMAL
BACTERIA UR CULT: ABNORMAL

## 2024-01-08 RX ORDER — AMOXICILLIN 875 MG
875 TABLET ORAL 2 TIMES DAILY
Qty: 14 TABLET | Refills: 0 | Status: SHIPPED | OUTPATIENT
Start: 2024-01-08 | End: 2024-01-15

## 2024-01-19 ENCOUNTER — VIRTUAL VISIT (OUTPATIENT)
Dept: FAMILY MEDICINE | Facility: CLINIC | Age: 39
End: 2024-01-19
Payer: COMMERCIAL

## 2024-01-19 ENCOUNTER — LAB (OUTPATIENT)
Dept: FAMILY MEDICINE | Facility: CLINIC | Age: 39
End: 2024-01-19
Attending: FAMILY MEDICINE
Payer: COMMERCIAL

## 2024-01-19 DIAGNOSIS — R39.15 URINARY URGENCY: ICD-10-CM

## 2024-01-19 DIAGNOSIS — R09.89 SYMPTOMS OF UPPER RESPIRATORY INFECTION (URI): ICD-10-CM

## 2024-01-19 DIAGNOSIS — R09.89 SYMPTOMS OF UPPER RESPIRATORY INFECTION (URI): Primary | ICD-10-CM

## 2024-01-19 LAB
ALBUMIN UR-MCNC: NEGATIVE MG/DL
APPEARANCE UR: CLEAR
BACTERIA #/AREA URNS HPF: ABNORMAL /HPF
BILIRUB UR QL STRIP: NEGATIVE
COLOR UR AUTO: YELLOW
DEPRECATED S PYO AG THROAT QL EIA: NEGATIVE
FLUAV RNA SPEC QL NAA+PROBE: NEGATIVE
FLUBV RNA RESP QL NAA+PROBE: NEGATIVE
GLUCOSE UR STRIP-MCNC: NEGATIVE MG/DL
GROUP A STREP BY PCR: NOT DETECTED
HGB UR QL STRIP: NEGATIVE
KETONES UR STRIP-MCNC: NEGATIVE MG/DL
LEUKOCYTE ESTERASE UR QL STRIP: NEGATIVE
NITRATE UR QL: NEGATIVE
PH UR STRIP: 7 [PH] (ref 5–7)
RBC #/AREA URNS AUTO: ABNORMAL /HPF
RSV RNA SPEC NAA+PROBE: NEGATIVE
SARS-COV-2 RNA RESP QL NAA+PROBE: NEGATIVE
SP GR UR STRIP: >=1.03 (ref 1–1.03)
SQUAMOUS #/AREA URNS AUTO: ABNORMAL /LPF
UROBILINOGEN UR STRIP-ACNC: 0.2 E.U./DL
WBC #/AREA URNS AUTO: ABNORMAL /HPF

## 2024-01-19 PROCEDURE — 81001 URINALYSIS AUTO W/SCOPE: CPT

## 2024-01-19 PROCEDURE — 99213 OFFICE O/P EST LOW 20 MIN: CPT | Mod: 95 | Performed by: FAMILY MEDICINE

## 2024-01-19 PROCEDURE — 87651 STREP A DNA AMP PROBE: CPT

## 2024-01-19 PROCEDURE — 87637 SARSCOV2&INF A&B&RSV AMP PRB: CPT

## 2024-01-19 PROCEDURE — 99207 PR NO CHARGE NURSE ONLY: CPT

## 2024-01-19 NOTE — PROGRESS NOTES
"    Instructions Relayed to Patient by Virtual Roomer:     Patient is active on [employer]:   Relayed following to patient: \"It looks like you are active on [employer], are you able to join the visit this way? If not, do you need us to send you a link now or would you like your provider to send a link via text or email when they are ready to initiate the visit?\"    Reminded patient to ensure they were logged on to virtual visit by arrival time listed. Documented in appointment notes if patient had flexibility to initiate visit sooner than arrival time. If pediatric virtual visit, ensured pediatric patient along with parent/guardian will be present for video visit.     Patient offered the website [email or web address] and/or phone number to [employer] Help line: [phone]    Aura is a 39 year old who is being evaluated via a billable video visit.      How would you like to obtain your AVS? AvidiaharOONi  If the video visit is dropped, the invitation should be resent by: Text to cell phone: [phone]  Will anyone else be joining your video visit? No      Pt would like a UA done for ongoing uti and strep test done before going back to work      Subjective   Aura is a 39 year old, presenting for the following health issues:  Pharyngitis        1/19/2024    11:43 AM   Additional Questions   Roomed by nancy   Accompanied by self     HPI     Acute Illness  Acute illness concerns: URI - Negative covid  Onset/Duration: 2-3 days   Symptoms:  Fever: No  Chills/Sweats: No  Headache (location?): YES - severe  Sinus Pressure: YES  Conjunctivitis:  No  Ear Pain: no  Rhinorrhea: No  Congestion: YES  Sore Throat: YES  Cough: YES-non-productive  Wheeze: No  Decreased Appetite: No  Nausea: No  Vomiting: No  Diarrhea: No  Dysuria/Freq.: No  Dysuria or Hematuria: No  Fatigue/Achiness: YES - extremely exhausted  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        Objective           Vitals:  No vitals were obtained today due to virtual visit.    Physical Exam   GENERAL: alert and no distress  EYES: Eyes grossly normal to inspection.  No discharge or erythema, or obvious scleral/conjunctival abnormalities.  RESP: No audible wheeze, cough, or visible cyanosis.    SKIN: Visible skin clear. No significant rash, abnormal pigmentation or lesions.  NEURO: Cranial nerves grossly intact.  Mentation and speech appropriate for age.  PSYCH: Appropriate affect, tone, and pace of words    A/P:    (R09.89) Symptoms of upper respiratory infection (URI)  (primary encounter diagnosis)  Comment:   Plan: Streptococcus A Rapid Screen w/Reflex to PCR -         Clinic Collect, Symptomatic Influenza A/B, RSV,        & SARS-CoV2 PCR (COVID-19)        Rule out covid19, influenza, rsv and strep.    (R39.15) Urinary urgency  Comment:   Plan: UA with Microscopic reflex to Culture - lab         collect        Patient had group b strep uti and treated with amoxicillin. Recheck.          Video-Visit Details    Type of service:  Video Visit   Video Start Time:  1150AM  Video End Time:11:57 AM    Originating Location (pt. Location): Home    Distant Location (provider location):  On-site  Platform used for Video Visit: Subha  Signed Electronically by: Kannan Meek MD, MD

## 2024-01-30 RX ORDER — NITROFURANTOIN 25; 75 MG/1; MG/1
100 CAPSULE ORAL 2 TIMES DAILY
Qty: 10 CAPSULE | Refills: 0 | Status: CANCELLED | OUTPATIENT
Start: 2024-01-30

## 2024-02-06 ENCOUNTER — OFFICE VISIT (OUTPATIENT)
Dept: FAMILY MEDICINE | Facility: CLINIC | Age: 39
End: 2024-02-06
Payer: COMMERCIAL

## 2024-02-06 VITALS
HEIGHT: 62 IN | RESPIRATION RATE: 16 BRPM | OXYGEN SATURATION: 100 % | TEMPERATURE: 97.8 F | DIASTOLIC BLOOD PRESSURE: 86 MMHG | SYSTOLIC BLOOD PRESSURE: 118 MMHG | WEIGHT: 120.2 LBS | BODY MASS INDEX: 22.12 KG/M2 | HEART RATE: 78 BPM

## 2024-02-06 DIAGNOSIS — F51.01 PRIMARY INSOMNIA: ICD-10-CM

## 2024-02-06 DIAGNOSIS — N97.9 FEMALE INFERTILITY: ICD-10-CM

## 2024-02-06 DIAGNOSIS — F32.81 PREMENSTRUAL DYSPHORIC DISORDER: ICD-10-CM

## 2024-02-06 DIAGNOSIS — B00.9 HERPES SIMPLEX VIRUS INFECTION: Primary | ICD-10-CM

## 2024-02-06 PROBLEM — Z34.90 PREGNANT: Status: RESOLVED | Noted: 2017-08-08 | Resolved: 2024-02-06

## 2024-02-06 PROCEDURE — 90471 IMMUNIZATION ADMIN: CPT | Performed by: NURSE PRACTITIONER

## 2024-02-06 PROCEDURE — 99213 OFFICE O/P EST LOW 20 MIN: CPT | Mod: 25 | Performed by: NURSE PRACTITIONER

## 2024-02-06 PROCEDURE — 90686 IIV4 VACC NO PRSV 0.5 ML IM: CPT | Performed by: NURSE PRACTITIONER

## 2024-02-06 RX ORDER — VALACYCLOVIR HYDROCHLORIDE 1 G/1
2000 TABLET, FILM COATED ORAL 2 TIMES DAILY
Qty: 4 TABLET | Refills: 5 | Status: SHIPPED | OUTPATIENT
Start: 2024-02-06 | End: 2024-06-13

## 2024-02-06 ASSESSMENT — PAIN SCALES - GENERAL: PAINLEVEL: NO PAIN (0)

## 2024-02-06 ASSESSMENT — ANXIETY QUESTIONNAIRES
GAD7 TOTAL SCORE: 7
1. FEELING NERVOUS, ANXIOUS, OR ON EDGE: SEVERAL DAYS
2. NOT BEING ABLE TO STOP OR CONTROL WORRYING: SEVERAL DAYS
IF YOU CHECKED OFF ANY PROBLEMS ON THIS QUESTIONNAIRE, HOW DIFFICULT HAVE THESE PROBLEMS MADE IT FOR YOU TO DO YOUR WORK, TAKE CARE OF THINGS AT HOME, OR GET ALONG WITH OTHER PEOPLE: SOMEWHAT DIFFICULT
GAD7 TOTAL SCORE: 7
6. BECOMING EASILY ANNOYED OR IRRITABLE: MORE THAN HALF THE DAYS
5. BEING SO RESTLESS THAT IT IS HARD TO SIT STILL: SEVERAL DAYS
3. WORRYING TOO MUCH ABOUT DIFFERENT THINGS: SEVERAL DAYS
7. FEELING AFRAID AS IF SOMETHING AWFUL MIGHT HAPPEN: NOT AT ALL

## 2024-02-06 ASSESSMENT — PATIENT HEALTH QUESTIONNAIRE - PHQ9
5. POOR APPETITE OR OVEREATING: SEVERAL DAYS
SUM OF ALL RESPONSES TO PHQ QUESTIONS 1-9: 6

## 2024-02-06 NOTE — PROGRESS NOTES
Assessment & Plan     Herpes simplex virus infection  Refilled Valtrex for use.  - valACYclovir (VALTREX) 1000 mg tablet; Take 2 tablets (2,000 mg) by mouth 2 times daily    Premenstrual dysphoric disorder  Discussed trying ibuprofen prior to to see if this helps with symptoms.    Primary insomnia  Advised trying Unisom, Benadryl, melatonin 1 to 3 mL once, or 5-10 mg THC for sleep.    Female infertility  Referral placed to OB to discuss fertility since she would like to get pregnant again.  She has been on Clomid in the past and this was helpful.  - Ob/Gyn  Referral; Future    See Patient Instructions    David Chavarria is a 39 year old, presenting for the following health issues:  Establish Care, Sleep Problem, and Fertility (Not able to conceive x 1 year wants fertility referral )        2/6/2024     3:14 PM   Additional Questions   Roomed by RMB   Accompanied by SELF         2/6/2024     3:14 PM   Patient Reported Additional Medications   Patient reports taking the following new medications NONE     History of Present Illness       Reason for visit:  Establish PCP, discuss anxiety, clomid, difficulty with sleeping    She eats 2-3 servings of fruits and vegetables daily.She consumes 2 sweetened beverage(s) daily.She exercises with enough effort to increase her heart rate 10 to 19 minutes per day.  She exercises with enough effort to increase her heart rate 3 or less days per week.   She is taking medications regularly.     Review of Systems  CONSTITUTIONAL: NEGATIVE for fever, chills, change in weight  INTEGUMENTARY/SKIN: NEGATIVE for worrisome rashes, moles or lesions  EYES: NEGATIVE for vision changes or irritation  ENT/MOUTH: NEGATIVE for ear, mouth and throat problems  RESP: NEGATIVE for significant cough or SOB  BREAST: NEGATIVE for masses, tenderness or discharge  CV: NEGATIVE for chest pain, palpitations or peripheral edema  GI: NEGATIVE for nausea, abdominal pain, heartburn, or change in  "bowel habits  : cramping with periods, takes ibuprofen which helps.  MUSCULOSKELETAL: NEGATIVE for significant arthralgias or myalgia  NEURO: POSITIVE for intermittent numbness in arms at night; wakes up with this  ENDOCRINE: NEGATIVE for temperature intolerance, skin/hair changes  HEME/ALLERGY/IMMUNE: NEGATIVE for bleeding problems  PSYCHIATRIC: NEGATIVE for changes in mood or affect  ROS otherwise negative      Objective    /86   Pulse 78   Temp 97.8  F (36.6  C) (Tympanic)   Resp 16   Ht 1.575 m (5' 2\")   Wt 54.5 kg (120 lb 3.2 oz)   LMP 02/05/2024 (Approximate)   SpO2 100%   BMI 21.98 kg/m    Body mass index is 21.98 kg/m .  Physical Exam   GENERAL: alert and no distress  RESP: lungs clear to auscultation - no rales, rhonchi or wheezes  CV: regular rate and rhythm, normal S1 S2, no S3 or S4, no murmur, click or rub, no peripheral edema  PSYCH: mentation appears normal, affect normal/bright    Signed Electronically by: Maranda Craig NP    "

## 2024-02-06 NOTE — PATIENT INSTRUCTIONS
I refilled your valacyclovir with a couple refills.  Use benadryl, unisom, 1-3 mg of Melatonin and take after supper daily or try THC gummies or lozenges 2.5-5 mg to start and increase up to 10 mg if needed for sleep. (OPTIONS)

## 2024-02-08 ENCOUNTER — E-VISIT (OUTPATIENT)
Dept: URGENT CARE | Facility: CLINIC | Age: 39
End: 2024-02-08
Payer: COMMERCIAL

## 2024-02-08 DIAGNOSIS — B30.9 VIRAL CONJUNCTIVITIS: Primary | ICD-10-CM

## 2024-02-08 PROCEDURE — 99207 PR NON-BILLABLE SERV PER CHARTING: CPT | Performed by: PREVENTIVE MEDICINE

## 2024-02-08 RX ORDER — KETOTIFEN FUMARATE 0.35 MG/ML
SOLUTION/ DROPS OPHTHALMIC
Qty: 5 ML | Refills: 0 | Status: SHIPPED | OUTPATIENT
Start: 2024-02-08 | End: 2024-06-17

## 2024-02-08 NOTE — PATIENT INSTRUCTIONS
Thank you for choosing us for your care. I have placed an order for a prescription so that you can start treatment. View your full visit summary for details by clicking on the link below. Your pharmacist will able to address any questions you may have about the medication.     If you re not feeling better within 2-3 days, please schedule an appointment.  You can schedule an appointment right here in [facility], or call [phone]  If the visit is for the same symptoms as your eVisit, we ll refund the cost of your eVisit if seen within seven days.    Pinkeye: Care Instructions  Overview     Pinkeye is redness and swelling of the eye surface and the conjunctiva (the lining of the eyelid and the covering of the white part of the eye). Pinkeye is also called conjunctivitis. Pinkeye is often caused by infection with bacteria or a virus. Dry air, allergies, smoke, and chemicals are other common causes.  Pinkeye often gets better on its own in 7 to 10 days. Antibiotics only help if the pinkeye is caused by bacteria. Pinkeye caused by infection spreads easily. If an allergy or chemical is causing pinkeye, it will not go away unless you can avoid whatever is causing it.  Follow-up care is a key part of your treatment and safety. Be sure to make and go to all appointments, and call your doctor if you are having problems. It's also a good idea to know your test results and keep a list of the medicines you take.  How can you care for yourself at home?  Wash your hands often. Always wash them before and after you treat pinkeye or touch your eyes or face.  Use moist cotton or a clean, wet cloth to remove crust. Wipe from the inside corner of the eye to the outside. Use a clean part of the cloth for each wipe.  Put cold or warm wet cloths on your eye a few times a day if the eye hurts.  Do not wear contact lenses or eye makeup until the pinkeye is gone. Throw away any eye makeup you were using when you got pinkeye. Clean your  "contacts and storage case. If you wear disposable contacts, use a new pair when your eye has cleared and it is safe to wear contacts again.  If the doctor gave you antibiotic ointment or eyedrops, use them as directed. Use the medicine for as long as instructed, even if your eye starts looking better soon. Keep the bottle tip clean, and do not let it touch the eye area.  To put in eyedrops or ointment:  Tilt your head back, and pull your lower eyelid down with one finger.  Drop or squirt the medicine inside the lower lid.  Close your eye for 30 to 60 seconds to let the drops or ointment move around.  Do not touch the ointment or dropper tip to your eyelashes or any other surface.  Do not share towels, pillows, or washcloths while you have pinkeye.  When should you call for help?   Call your doctor now or seek immediate medical care if:    You have pain in your eye, not just irritation on the surface.     You have a change in vision or loss of vision.     You have an increase in discharge from the eye.     Your eye has not started to improve or begins to get worse within 48 hours after you start using antibiotics.     Pinkeye lasts longer than 7 days.   Watch closely for changes in your health, and be sure to contact your doctor if you have any problems.  Where can you learn more?  Go to https://www.[employer].net/patiented  Enter Y392 in the search box to learn more about \"Pinkeye: Care Instructions.\"  Current as of: June 6, 2023               Content Version: 13.8    8240-7289 [employer].   Care instructions adapted under license by your healthcare professional. If you have questions about a medical condition or this instruction, always ask your healthcare professional. [employer] disclaims any warranty or liability for your use of this information.      "

## 2024-02-19 ENCOUNTER — MYC MEDICAL ADVICE (OUTPATIENT)
Dept: FAMILY MEDICINE | Facility: CLINIC | Age: 39
End: 2024-02-19
Payer: COMMERCIAL

## 2024-02-19 DIAGNOSIS — R39.9 URINARY SYMPTOM OR SIGN: Primary | ICD-10-CM

## 2024-02-20 ENCOUNTER — E-VISIT (OUTPATIENT)
Dept: URGENT CARE | Facility: CLINIC | Age: 39
End: 2024-02-20
Payer: COMMERCIAL

## 2024-02-20 DIAGNOSIS — N39.0 ACUTE UTI (URINARY TRACT INFECTION): Primary | ICD-10-CM

## 2024-02-20 PROCEDURE — 99207 PR NON-BILLABLE SERV PER CHARTING: CPT | Performed by: EMERGENCY MEDICINE

## 2024-02-20 RX ORDER — NITROFURANTOIN 25; 75 MG/1; MG/1
100 CAPSULE ORAL 2 TIMES DAILY
Qty: 10 CAPSULE | Refills: 0 | Status: SHIPPED | OUTPATIENT
Start: 2024-02-20 | End: 2024-02-25

## 2024-02-20 NOTE — PATIENT INSTRUCTIONS
Dear Aura Hope    After reviewing your responses, I've been able to diagnose you with a urinary tract infection, which is a common infection of the bladder with bacteria.  This is not a sexually transmitted infection, though urinating immediately after intercourse can help prevent infections.  Drinking lots of fluids is also helpful to clear your current infection and prevent the next one.      I have sent a prescription for antibiotics to your pharmacy to treat this infection.    It is important that you take all of your prescribed medication even if your symptoms are improving after a few doses.  Taking all of your medicine helps prevent the symptoms from returning.     If your symptoms worsen, you develop pain in your back or stomach, develop fevers, or are not improving in 5 days, please contact your primary care provider for an appointment or visit any of our convenient Walk-in or Urgent Care Centers to be seen, which can be found on our website here.    Thanks again for choosing us as your health care partner,    Jimmy Townsend MD  Urinary Tract Infection (UTI) in Women: Care Instructions  Overview     A urinary tract infection, or UTI, is a general term for an infection anywhere between the kidneys and the urethra (where urine comes out). Most UTIs are bladder infections. They often cause pain or burning when you urinate.  UTIs are caused by bacteria and can be cured with antibiotics. Be sure to complete your treatment so that the infection does not get worse.  Follow-up care is a key part of your treatment and safety. Be sure to make and go to all appointments, and call your doctor if you are having problems. It's also a good idea to know your test results and keep a list of the medicines you take.  How can you care for yourself at home?    Take your antibiotics as directed. Do not stop taking them just because you feel better. You need to take the full course of antibiotics.    Drink extra water  "and other fluids for the next day or two. This will help make the urine less concentrated and help wash out the bacteria that are causing the infection. (If you have kidney, heart, or liver disease and have to limit fluids, talk with your doctor before you increase the amount of fluids you drink.)    Avoid drinks that are carbonated or have caffeine. They can irritate the bladder.    Urinate often. Try to empty your bladder each time.    To relieve pain, take a hot bath or lay a heating pad set on low over your lower belly or genital area. Never go to sleep with a heating pad in place.  To prevent UTIs    Drink plenty of water each day. This helps you urinate often, which clears bacteria from your system. (If you have kidney, heart, or liver disease and have to limit fluids, talk with your doctor before you increase the amount of fluids you drink.)    Urinate when you need to.    If you are sexually active, urinate right after you have sex.    Change sanitary pads often.    Avoid douches, bubble baths, feminine hygiene sprays, and other feminine hygiene products that have deodorants.    After going to the bathroom, wipe from front to back.  When should you call for help?   Call your doctor now or seek immediate medical care if:      Symptoms such as fever, chills, nausea, or vomiting get worse or appear for the first time.       You have new pain in your back just below your rib cage. This is called flank pain.       There is new blood or pus in your urine.       You have any problems with your antibiotic medicine.   Watch closely for changes in your health, and be sure to contact your doctor if:      You are not getting better after taking an antibiotic for 2 days.       Your symptoms go away but then come back.   Where can you learn more?  Go to https://www.healthwise.net/patiented  Enter K848 in the search box to learn more about \"Urinary Tract Infection (UTI) in Women: Care Instructions.\"  Current as of: " February 28, 2023               Content Version: 13.8    6991-6025 [employer].   Care instructions adapted under license by your healthcare professional. If you have questions about a medical condition or this instruction, always ask your healthcare professional. [employer] disclaims any warranty or liability for your use of this information.

## 2024-03-11 ENCOUNTER — OFFICE VISIT (OUTPATIENT)
Dept: OBGYN | Facility: CLINIC | Age: 39
End: 2024-03-11
Payer: COMMERCIAL

## 2024-03-11 VITALS
WEIGHT: 118 LBS | HEIGHT: 62 IN | SYSTOLIC BLOOD PRESSURE: 134 MMHG | BODY MASS INDEX: 21.71 KG/M2 | RESPIRATION RATE: 18 BRPM | DIASTOLIC BLOOD PRESSURE: 77 MMHG | TEMPERATURE: 98.9 F | HEART RATE: 91 BPM

## 2024-03-11 DIAGNOSIS — N97.9 FEMALE INFERTILITY: Primary | ICD-10-CM

## 2024-03-11 DIAGNOSIS — N39.0 RECURRENT UTI: ICD-10-CM

## 2024-03-11 DIAGNOSIS — Z12.4 CERVICAL CANCER SCREENING: ICD-10-CM

## 2024-03-11 LAB
HBA1C MFR BLD: 5 % (ref 0–5.6)
TSH SERPL DL<=0.005 MIU/L-ACNC: 2.75 UIU/ML (ref 0.3–4.2)

## 2024-03-11 PROCEDURE — 99215 OFFICE O/P EST HI 40 MIN: CPT | Performed by: STUDENT IN AN ORGANIZED HEALTH CARE EDUCATION/TRAINING PROGRAM

## 2024-03-11 PROCEDURE — 87624 HPV HI-RISK TYP POOLED RSLT: CPT | Performed by: STUDENT IN AN ORGANIZED HEALTH CARE EDUCATION/TRAINING PROGRAM

## 2024-03-11 PROCEDURE — 83001 ASSAY OF GONADOTROPIN (FSH): CPT | Performed by: STUDENT IN AN ORGANIZED HEALTH CARE EDUCATION/TRAINING PROGRAM

## 2024-03-11 PROCEDURE — 82670 ASSAY OF TOTAL ESTRADIOL: CPT | Performed by: STUDENT IN AN ORGANIZED HEALTH CARE EDUCATION/TRAINING PROGRAM

## 2024-03-11 PROCEDURE — G0145 SCR C/V CYTO,THINLAYER,RESCR: HCPCS | Performed by: STUDENT IN AN ORGANIZED HEALTH CARE EDUCATION/TRAINING PROGRAM

## 2024-03-11 PROCEDURE — 83036 HEMOGLOBIN GLYCOSYLATED A1C: CPT | Performed by: STUDENT IN AN ORGANIZED HEALTH CARE EDUCATION/TRAINING PROGRAM

## 2024-03-11 PROCEDURE — 82166 ASSAY ANTI-MULLERIAN HORM: CPT | Performed by: STUDENT IN AN ORGANIZED HEALTH CARE EDUCATION/TRAINING PROGRAM

## 2024-03-11 PROCEDURE — 84443 ASSAY THYROID STIM HORMONE: CPT | Performed by: STUDENT IN AN ORGANIZED HEALTH CARE EDUCATION/TRAINING PROGRAM

## 2024-03-11 PROCEDURE — 36415 COLL VENOUS BLD VENIPUNCTURE: CPT | Performed by: STUDENT IN AN ORGANIZED HEALTH CARE EDUCATION/TRAINING PROGRAM

## 2024-03-11 NOTE — PATIENT INSTRUCTIONS
"Aura,   It was nice to meet you today.    The laboratory workup we obtained today includes estradiol/follicular stimulating hormone (to see whether you have premature ovarian insufficiency), thyroid-stimulating hormone level, anti-müllerian hormone level (to evaluate ovarian reserve), hemoglobin A1c (check for type 2 diabetes).    Please call the ultrasound department to schedule with a pelvic ultrasound.    Once all laboratory results returned normal, I will send 5-day prescription of Clomid 50 mg to [facility] pharmacy in [place].  You are supposed to start taking the Clomid 50 mg daily starting on cycle day 3 to cycle day 7.  Cycle day 1 is the first day of your menses. Please call [facility] #: [phone] to schedule a progesterone level for cycle day 21.  This will help us to see whether we need to increase the Clomid dosage.    Again side effect for Clomid includes headache, hot flashes, perimenopausal symptoms.  Please let us know if you have any visual changes or side effects from Clomid that will necessitate stopping this medication.  Clomid is associated with a 8% chance of twin pregnancy.    Search \"irina pacheco md\" on Youtube for information about more information about ovulation induction.  "

## 2024-03-11 NOTE — PROGRESS NOTES
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 My suspected cause is unexplained infertility.     Counseled the patient on the basic steps to conception and reproductive anatomy, normal rates of conception during one year (~85%) and suspected pathology for her infertility. Discussed menstrual tracking, use of ovulation predictor kits and timed intercourse. Recommended a menstrual calendar or phone eric.     Lab work obtained today: estradiol, FSH, TSH, (prolactin not obtained due to regular menses), AMH, hgbA1C. No hyperandrogenism symptoms, thus did not obtain free and total testosterone, DHEAS, 17-OHP, and lipid panel. Plan to obtain pelvic US today to assess for uterine/ovarian/tubal pathology. HSG ordered, but patient prefers to try clomid for 3 cycles first if all workups are normal - planning on starting clomid 50mg daily from cycle 3 to 7 with day 21 progesterone level to see whether we need to increase clomid dosage. If no success with clomid for 3 cycles, then HSG to assess for tubal patency + semen analysis.     I discussed Clomid side effect including headache, hot flashes, perimenopausal symptoms (patient is made aware that she needs to discontinue Clomid if she has any visual changes).  I discussed that I suspect patient has unexplained infertility assuming all workups return normal and ovulation stimulation + IUI is the recommended treatment therapy for unexplained infertility.  Patient preferred to try ovulation induction solely at this time.    I spent a total of 45 min with the patient, of which >50% was spent in counseling and care coordination.   Will MyChart with results and make plan from there.     Patient endorsed postcoital recurring UTI symptoms -Keflex 250 mg once as needed after sexual intercourse prescribed for recurrent UTI.    Pap smear obtained  today for cervical cancer screening.    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     Had 3 different partners so far: unable to get pregnant with her first partner, got pregnant with clomid with her 2nd partner, not able to get pregnant despite actively trying for the past year with her 3rd partner (he had a daughter with a different female).    Anxiety not associated with period. Worry about different things. Denied hot flushes, vaginal dryness, decreased sex drive.    ROS: A 10 pt ROS was completed and found to be negative unless mentioned in the HPI.     OBGYN Hx:     Last pregnancy was results of clomid.    OB History    Para Term  AB Living   1 1 1 0 0 1   SAB IAB Ectopic Multiple Live Births   0 0 0 0 1      # Outcome Date GA Lbr Joce/2nd Weight Sex Delivery Anes PTL Lv   1 Term 17 39w5d  3.09 kg (6 lb 13 oz) M CS-LTranv Spinal, EPI  VIRIDIANA      Complications: Fetal Intolerance, Failure to Progress in First Stage      Name: TAMARA PERDOMO      Apgar1: 9  Apgar5: 9     Pregnancy complications: denied GDM, gHTN.   Delivery complications: PLTCS for arrest of dilation and fetal intolerance; narrow pelvis.    Female infertility factors:  Ovulation   Menses: Patient's last menstrual period was 2024 (approximate)., 3/3, every 28-30 days, lasting 7 days, menarche at 16yo, menses are very painful that's alleviated with ibuprofen and heat therapy.   Ovulation predictor kits: no   Coital frequency and timing: using phone with ovulation prediction timing and timed intercourse for 3 cycles - daily. Denied pain with intercourse, postcoital bleeding. Frequently UTIs weekly (cramping when she is not on her period, increased urinary  "frequency with decreased amount, urinary odor)  Contraceptive use history: pills 6 years ago  Denies acne, facial/back/abdominal hair growth (hair thinning), denies nipple discharge, breast mass, visual field defects    Pt denies excessive exercise (daily but not excessively), restrictive eating or a history of disordered eating.     Tubal, uterine, cervical factors   H/o STI/PID: denied   H/o endometriosis: unknown    General health   Pertinent PMH: Body mass index is 21.58 kg/m ., systemic illness, childhood illness   Pertinent medications: none   Pertinent surgeries: right wrist ganglion cyst removal, CS x1   Smoking, caffeine, alcohol, drug use: patient is a non-smoker, denied alcohol or other recreational drug use   Occupation:    Family history: developmental delay. Mother gone through menopause at 37yo.    Male infertility factors:   Name, age, birthday: Otf [place], 6/9/1987?   PMH: denied, denies history of cancer or mumps    Smoking, alcohol, drug use: denied   Prior children: 1 daughter with another partner   Semen analysis: uncertain    Infertility treatment to date: Clomid that resulted in her pregnancy with her son    Physical Exam:  VS: /77 (BP Location: Left arm, Patient Position: Chair, Cuff Size: Adult Regular)   Pulse 91   Temp 98.9  F (37.2  C) (Tympanic)   Resp 18   Ht 1.575 m (5' 2\")   Wt 53.5 kg (118 lb)   LMP 02/05/2024 (Approximate)   BMI 21.58 kg/m    Gen: Pleasant, talkative female in no apparent distress   Endocrine: Thyroid without enlargement or nodularity   Dermatology: no acne, hirsutism or facial/back/abdominal hair growth appreciated   Lymph: no appreciable cervical lymphadenopathy  Respiratory: breathing comfortably on room air   Cardiac: warm and well-perfused.   GI: Abd soft and non-tender  : small 2mm skin tag noted lateral to left labial majora at 4 o'clock. Normal external genitalia and vagina. No lesions. Normal appearing cervix  MSK: " Grossly normal movement of all four extremities  Psych: mood and affect bright

## 2024-03-11 NOTE — NURSING NOTE
"Initial /77 (BP Location: Left arm, Patient Position: Chair, Cuff Size: Adult Regular)   Pulse 91   Temp 98.9  F (37.2  C) (Tympanic)   Resp 18   Ht 1.575 m (5' 2\")   Wt 53.5 kg (118 lb)   LMP 02/05/2024 (Approximate)   BMI 21.58 kg/m   Estimated body mass index is 21.58 kg/m  as calculated from the following:    Height as of this encounter: 1.575 m (5' 2\").    Weight as of this encounter: 53.5 kg (118 lb). .    "

## 2024-03-12 LAB
ESTRADIOL SERPL-MCNC: 55 PG/ML
FSH SERPL IRP2-ACNC: 10.7 MIU/ML

## 2024-03-13 LAB
Lab: NORMAL
PERFORMING LABORATORY: NORMAL
SPECIMEN STATUS: NORMAL
TEST NAME: NORMAL

## 2024-03-14 LAB
BKR LAB AP GYN ADEQUACY: NORMAL
BKR LAB AP GYN INTERPRETATION: NORMAL
BKR LAB AP HPV REFLEX: NORMAL
BKR LAB AP PREVIOUS ABNORMAL: NORMAL
PATH REPORT.COMMENTS IMP SPEC: NORMAL
PATH REPORT.COMMENTS IMP SPEC: NORMAL
PATH REPORT.RELEVANT HX SPEC: NORMAL

## 2024-03-15 ENCOUNTER — MYC MEDICAL ADVICE (OUTPATIENT)
Dept: OBGYN | Facility: CLINIC | Age: 39
End: 2024-03-15
Payer: COMMERCIAL

## 2024-03-15 DIAGNOSIS — N97.9 FEMALE INFERTILITY: Primary | ICD-10-CM

## 2024-03-15 LAB — MISCELLANEOUS TEST 1 (ARUP): NORMAL

## 2024-03-18 ENCOUNTER — TELEPHONE (OUTPATIENT)
Dept: OBGYN | Facility: CLINIC | Age: 39
End: 2024-03-18
Payer: COMMERCIAL

## 2024-03-18 ENCOUNTER — PATIENT OUTREACH (OUTPATIENT)
Dept: OBGYN | Facility: CLINIC | Age: 39
End: 2024-03-18
Payer: COMMERCIAL

## 2024-03-18 DIAGNOSIS — R87.610 ASCUS WITH POSITIVE HIGH RISK HPV CERVICAL: Primary | ICD-10-CM

## 2024-03-18 DIAGNOSIS — R87.810 ASCUS WITH POSITIVE HIGH RISK HPV CERVICAL: Primary | ICD-10-CM

## 2024-03-18 DIAGNOSIS — N97.9 FEMALE INFERTILITY: Primary | ICD-10-CM

## 2024-03-18 LAB
HUMAN PAPILLOMA VIRUS 16 DNA: NEGATIVE
HUMAN PAPILLOMA VIRUS 18 DNA: NEGATIVE
HUMAN PAPILLOMA VIRUS FINAL DIAGNOSIS: ABNORMAL
HUMAN PAPILLOMA VIRUS OTHER HR: POSITIVE

## 2024-03-18 RX ORDER — CLOMIPHENE CITRATE 50 MG/1
50 TABLET ORAL DAILY
Qty: 5 TABLET | Refills: 0 | Status: SHIPPED | OUTPATIENT
Start: 2024-03-18 | End: 2024-04-02

## 2024-03-18 NOTE — TELEPHONE ENCOUNTER
Only Brand name Clomid is available.    Prior Authorization Retail Medication Request    Medication/Dose: Clomid 50mg tabs  Diagnosis and ICD code (if different than what is on RX):  na  New/renewal/insurance change PA/secondary ins. PA:  Previously Tried and Failed:  na  Rationale:  na    Insurance   [place]  Insurance ID:  [id number]    Secondary (if applicable):na  Insurance ID:  na    Pharmacy Information (if different than what is on RX)  Name:  [facility] pharmacy  Phone:  [phone]  Fax:[phone]

## 2024-03-28 NOTE — TELEPHONE ENCOUNTER
PA Initiation    Medication: CLOMID 50 MG PO TABS  Insurance Company: [facility] - Phone [phone] Fax [phone]  Pharmacy Filling the Rx: [facility] [place], [place] - [address]  Filling Pharmacy Phone: [phone]  Filling Pharmacy Fax: [phone]  Start Date: 3/28/2024        Note: Due to record-high volumes, our turn-around time is taking longer than usual . We are currently 10 business days behind in the pools.   We are working diligently to submit all requests in a timely manner and in the order they are received. Please only flag TRUE URGENT requests as high priority to the pool at this time.   If you have questions - please send a note/message in the active PA encounter and send back to the RPPA (Retail Pharmacy Prior Authorization) team [[id number]].    If you have more specific questions about our process please reach out to our supervisor Leena Patricia.   Thank you!

## 2024-04-01 NOTE — TELEPHONE ENCOUNTER
Hi, Clomid was denied. If the provider would like to appeal, please provide a letter of medical necessity and route back to the team. Otherwise you can close the encounter. Thank you, [facility] PA Team

## 2024-04-01 NOTE — TELEPHONE ENCOUNTER
PRIOR AUTHORIZATION DENIED    Medication: CLOMID 50 MG PO TABS  Insurance Company: [employer] - Phone [phone] Fax [phone]  Denial Date: 3/29/2024  Denial Reason(s):         Appeal Information:         Patient Notified: NO

## 2024-04-02 RX ORDER — LETROZOLE 2.5 MG/1
2.5 TABLET, FILM COATED ORAL DAILY
Qty: 5 TABLET | Refills: 0 | Status: SHIPPED | OUTPATIENT
Start: 2024-04-02 | End: 2024-06-17

## 2024-06-13 ENCOUNTER — MYC REFILL (OUTPATIENT)
Dept: FAMILY MEDICINE | Facility: CLINIC | Age: 39
End: 2024-06-13
Payer: COMMERCIAL

## 2024-06-13 DIAGNOSIS — B00.9 HERPES SIMPLEX VIRUS INFECTION: ICD-10-CM

## 2024-06-13 NOTE — TELEPHONE ENCOUNTER
See MyChart refill request, pt requesting to change to prescription. Routing to PCP for review.    Alicia Pena RN  [facility]

## 2024-06-14 RX ORDER — VALACYCLOVIR HYDROCHLORIDE 1 G/1
2000 TABLET, FILM COATED ORAL 2 TIMES DAILY
Qty: 4 TABLET | Refills: 5 | Status: SHIPPED | OUTPATIENT
Start: 2024-06-14

## 2024-06-14 NOTE — TELEPHONE ENCOUNTER
Please notify patient that if frequency is increased that she needs to make an appointment to discuss increasing dose or regular dosing treatment if needed.  Maranda Craig NP on 6/14/2024 at 8:02 AM

## 2024-06-17 ENCOUNTER — OFFICE VISIT (OUTPATIENT)
Dept: FAMILY MEDICINE | Facility: CLINIC | Age: 39
End: 2024-06-17
Payer: COMMERCIAL

## 2024-06-17 ENCOUNTER — NURSE TRIAGE (OUTPATIENT)
Dept: FAMILY MEDICINE | Facility: CLINIC | Age: 39
End: 2024-06-17

## 2024-06-17 VITALS
RESPIRATION RATE: 16 BRPM | TEMPERATURE: 98.3 F | HEIGHT: 62 IN | DIASTOLIC BLOOD PRESSURE: 64 MMHG | SYSTOLIC BLOOD PRESSURE: 112 MMHG | OXYGEN SATURATION: 99 % | BODY MASS INDEX: 22.16 KG/M2 | WEIGHT: 120.4 LBS | HEART RATE: 74 BPM

## 2024-06-17 DIAGNOSIS — S39.012A LOW BACK STRAIN, INITIAL ENCOUNTER: Primary | ICD-10-CM

## 2024-06-17 PROCEDURE — 99213 OFFICE O/P EST LOW 20 MIN: CPT | Performed by: NURSE PRACTITIONER

## 2024-06-17 RX ORDER — PREDNISONE 20 MG/1
40 TABLET ORAL DAILY
Qty: 10 TABLET | Refills: 0 | Status: SHIPPED | OUTPATIENT
Start: 2024-06-17 | End: 2024-06-22

## 2024-06-17 RX ORDER — CYCLOBENZAPRINE HCL 5 MG
5-10 TABLET ORAL 3 TIMES DAILY PRN
Qty: 45 TABLET | Refills: 1 | Status: SHIPPED | OUTPATIENT
Start: 2024-06-17 | End: 2024-07-29

## 2024-06-17 ASSESSMENT — PAIN SCALES - GENERAL: PAINLEVEL: NO PAIN (0)

## 2024-06-17 NOTE — PROGRESS NOTES
Assessment & Plan     Low back strain, initial encounter  Likely low back strain with impingement on the nerve.  Ordered prednisone 40 mg for 5 days, flexeril 1-2 tabs as needed, and Physical Therapy referral placed for treatment.  I advised her that if symptoms are worsening or persistent despite above plan, to follow-up for further imaging.  - predniSONE (DELTASONE) 20 MG tablet; Take 2 tablets (40 mg) by mouth daily for 5 days  - cyclobenzaprine (FLEXERIL) 5 MG tablet; Take 1-2 tablets (5-10 mg) by mouth 3 times daily as needed for muscle spasms  - Physical Therapy  Referral; Future    See Patient Instructions    David Chavarria is a 39 year old, presenting for the following health issues:  Leg Pain        6/17/2024     2:04 PM   Additional Questions   Roomed by Shreya Slaughter CMA   Accompanied by Self     HPI       Pain History:  When did you first notice your pain? 1-2 weeks ago    Have you seen anyone else for your pain? No  How has your pain affected your ability to work? Affecting ability to work, gets worse through the day.   Where in your body do you have pain?  Left lower back down left leg.  Noticed it after working out at the gym.     Patient states the pain is in the left lower back and will radiate down the back of the leg to the calf.  She denies any bowel or bladder issues.  She has been trying to rest the back, stretch lightly, hot pads at night, cold once, and IB which helps for short times.  Denies any weakness in the left leg.      Review of Systems  CONSTITUTIONAL: NEGATIVE for fever, chills, change in weight  RESP: NEGATIVE for significant cough or SOB  CV: NEGATIVE for chest pain, palpitations or peripheral edema  MUSCULOSKELETAL: POSITIVE  for back pain left lower back and radicular pain down the left leg at times  PSYCHIATRIC: NEGATIVE for changes in mood or affect  ROS otherwise negative      Objective    /64   Pulse 74   Temp 98.3  F (36.8  C) (Tympanic)    "Resp 16   Ht 1.575 m (5' 2\")   Wt 54.6 kg (120 lb 6.4 oz)   SpO2 99%   BMI 22.02 kg/m    Body mass index is 22.02 kg/m .  Physical Exam   GENERAL: alert and no distress  Comprehensive back pain exam:  Tenderness of left lower back and buttock, Pain limits the following motions: bending, Lower extremity strength functional and equal on both sides, Lower extremity reflexes within normal limits bilaterally, Lower extremity sensation normal and equal on both sides, and Straight leg positive on  left, indicating possible ipsilateral radiculopathy  PSYCH: mentation appears normal, affect normal/bright    Signed Electronically by: Maranda Craig NP    "

## 2024-06-17 NOTE — PATIENT INSTRUCTIONS
Start Prednisone 40 mg daily for 5 days.  Use flexeril as needed.  Make appointment with Physical Therapy to work on the back pain.  Follow-up after 2 weeks when starting Physical Therapy if any persistent symptoms or if they are worsening.

## 2024-06-17 NOTE — TELEPHONE ENCOUNTER
"Patient scheduled for appt with Maranda Craig today.     Reason for Disposition   Thigh or calf pain in only one leg and present > 1 hour    Additional Information   Negative: Looks like a broken bone or dislocated joint (e.g., crooked or deformed)   Negative: Sounds like a life-threatening emergency to the triager   Negative: Chest pain   Negative: Difficulty breathing   Negative: Entire foot is cool or blue in comparison to other side   Negative: Unable to walk   Negative: Fever and red area (or area very tender to touch)   Negative: Swollen joint and fever    Answer Assessment - Initial Assessment Questions  1. ONSET: \"When did the pain start?\"       2 weeks ago  2. LOCATION: \"Where is the pain located?\"       Started in lower back and goes down to calf  3. PAIN: \"How bad is the pain?\"    (Scale 1-10; or mild, moderate, severe)    -  MILD (1-3): doesn't interfere with normal activities     -  MODERATE (4-7): interferes with normal activities (e.g., work or school) or awakens from sleep, limping     -  SEVERE (8-10): excruciating pain, unable to do any normal activities, unable to walk      moderate  4. WORK OR EXERCISE: \"Has there been any recent work or exercise that involved this part of the body?\"       Works out regularly  5. CAUSE: \"What do you think is causing the leg pain?\"      Unsure  6. OTHER SYMPTOMS: \"Do you have any other symptoms?\" (e.g., chest pain, back pain, breathing difficulty, swelling, rash, fever, numbness, weakness)      Winded easily, tailbone pain, constant stinging pain  7. PREGNANCY: \"Is there any chance you are pregnant?\" \"When was your last menstrual period?\"    Protocols used: Leg Pain-A-OH    "

## 2024-06-21 ENCOUNTER — MYC REFILL (OUTPATIENT)
Dept: FAMILY MEDICINE | Facility: CLINIC | Age: 39
End: 2024-06-21
Payer: COMMERCIAL

## 2024-06-21 DIAGNOSIS — S39.012A LOW BACK STRAIN, INITIAL ENCOUNTER: ICD-10-CM

## 2024-06-26 RX ORDER — PREDNISONE 20 MG/1
40 TABLET ORAL DAILY
Qty: 10 TABLET | Refills: 0 | OUTPATIENT
Start: 2024-06-26

## 2024-07-03 ENCOUNTER — MYC MEDICAL ADVICE (OUTPATIENT)
Dept: FAMILY MEDICINE | Facility: CLINIC | Age: 39
End: 2024-07-03
Payer: COMMERCIAL

## 2024-07-03 NOTE — TELEPHONE ENCOUNTER
Please fax Physical Therapy order to [facility]  Fax #:  [phone] ASAP.  Thanks,  Maranda Craig NP on 7/3/2024 at 6:37 PM

## 2024-07-12 ENCOUNTER — MYC MEDICAL ADVICE (OUTPATIENT)
Dept: FAMILY MEDICINE | Facility: CLINIC | Age: 39
End: 2024-07-12
Payer: COMMERCIAL

## 2024-07-12 DIAGNOSIS — S39.012A LOW BACK STRAIN, INITIAL ENCOUNTER: Primary | ICD-10-CM

## 2024-07-12 NOTE — TELEPHONE ENCOUNTER
Do we need a different PT referral? This specific referral is for [facility].    Please review and advise.    Tracey Farias MA

## 2024-07-12 NOTE — TELEPHONE ENCOUNTER
Routed to Maranda Craig.  Needs new PT referral to [facility] instead of a [facility] provider.  Lyubov Mason RN

## 2024-07-15 NOTE — TELEPHONE ENCOUNTER
I don't have a Physical Therapy ordered specific for [facility].  I took out the [facility] scheduling information and put in [facility] referral.  Hope this works.  Please re-fax.  Maranda Craig NP on 7/15/2024 at 9:05 AM

## 2024-07-29 ENCOUNTER — VIRTUAL VISIT (OUTPATIENT)
Dept: FAMILY MEDICINE | Facility: CLINIC | Age: 39
End: 2024-07-29
Payer: COMMERCIAL

## 2024-07-29 DIAGNOSIS — R09.81 CONGESTION OF PARANASAL SINUS: Primary | ICD-10-CM

## 2024-07-29 DIAGNOSIS — J30.2 SEASONAL ALLERGIC RHINITIS, UNSPECIFIED TRIGGER: ICD-10-CM

## 2024-07-29 PROCEDURE — 99442 PR PHYSICIAN TELEPHONE EVALUATION 11-20 MIN: CPT | Mod: 93

## 2024-07-29 RX ORDER — FLUTICASONE PROPIONATE 50 MCG
1 SPRAY, SUSPENSION (ML) NASAL DAILY
Qty: 16 G | Refills: 0 | Status: SHIPPED | OUTPATIENT
Start: 2024-07-29

## 2024-07-29 RX ORDER — MULTIVIT-MIN/IRON/FOLIC ACID/K 18-600-40
CAPSULE ORAL
COMMUNITY

## 2024-07-29 NOTE — PROGRESS NOTES
Aura is a 39 year old who is being evaluated via a billable telephone visit.    What phone number would you like to be contacted at? [phone]  How would you like to obtain your AVS? Kianna  Originating Location (pt. Location): Home    Distant Location (provider location):  On-site    Assessment & Plan     Congestion of paranasal sinus  Patient reports sinus pressure, nasal congestion, and headache for the previous three days without relief from Sudafed or Tylenol Sinus. Patient stopped OTC and started a 3 day Azithromycin (Z-pack) 250mg BID that she was given from her grandma. Patient completed day two today and was concerned she was not getting better. Patient reports she wants to complete Z-pack and patient confirmed it was not . Patient currently denies sore throat, chills, and body aches.      - loratadine-pseudoePHEDrine (CLARITIN-D 24-HOUR)  MG 24 hr tablet; Take 1 tablet by mouth daily for 10 days  - fluticasone (FLONASE) 50 MCG/ACT nasal spray; Spray 1 spray into both nostrils daily    Seasonal allergic rhinitis, unspecified trigger  Patient reported watery itchy eyes, as well as symptoms above.     - loratadine-pseudoePHEDrine (CLARITIN-D 24-HOUR)  MG 24 hr tablet; Take 1 tablet by mouth daily for 10 days  - fluticasone (FLONASE) 50 MCG/ACT nasal spray; Spray 1 spray into both nostrils daily    MEDICATIONS:  Continue current medications without change    David Chavarria is a 39 year old, presenting for the following health issues:  Sinus Problem        2024    12:49 PM   Additional Questions   Roomed by Leena SHEETS LPN   Accompanied by self         2024    12:49 PM   Patient Reported Additional Medications   Patient reports taking the following new medications no new meds     History of Present Illness       Reason for visit:  Sinus problem        Acute Illness  Acute illness concerns: sinus problem   Onset/Duration: last week 2024  Symptoms:  Fever:  No  Chills/Sweats: No  Headache (location?): YES- severe, hurting through her nose, ears and teeth   Sinus Pressure: YES - head feels like in a vice   Conjunctivitis:  YES- redness, no discharge   Ear Pain: YES: bilateral pain and teeth/jaw pain  Rhinorrhea: YES- and stuffy   Congestion: YES  Sore Throat: YES- started with a sore throat for about a day and then settled in her sinus's   Cough: YES-productive of green sputum   Wheeze: No  Nausea: No  Vomiting: No  Fatigue/Achiness: YES  Sick/Strep Exposure: YES- , couple kids had strep and some had runny noses   Therapies tried and outcome: sudafed for 2 days, Tylenol sinus relief for 2 days, z-pack on day 2 of it and has one last dose  - didn't seem to have relief from any of the treatment       Review of Systems  Constitutional, HEENT, cardiovascular, pulmonary, GI, , musculoskeletal, neuro, skin, endocrine and psych systems are negative, except as otherwise noted.      Objective    Vitals - Patient Reported  Pain Score: Severe Pain (7)  Pain Loc: Head      Vitals:  No vitals were obtained today due to virtual visit.    Physical Exam   General: Alert and no distress //Respiratory: No audible wheeze, cough, or shortness of breath // Psychiatric:  Appropriate affect, tone, and pace of words. Patient verbal responses sound congested.       Phone call duration: 14 minutes  Signed Electronically by: CHRISTINE Gamble CNP

## 2024-08-01 ENCOUNTER — TRANSFERRED RECORDS (OUTPATIENT)
Dept: HEALTH INFORMATION MANAGEMENT | Facility: CLINIC | Age: 39
End: 2024-08-01
Payer: COMMERCIAL

## 2024-09-03 ENCOUNTER — E-VISIT (OUTPATIENT)
Dept: URGENT CARE | Facility: CLINIC | Age: 39
End: 2024-09-03
Payer: COMMERCIAL

## 2024-09-03 DIAGNOSIS — N39.0 ACUTE UTI (URINARY TRACT INFECTION): Primary | ICD-10-CM

## 2024-09-03 PROCEDURE — 99207 PR NON-BILLABLE SERV PER CHARTING: CPT | Performed by: EMERGENCY MEDICINE

## 2024-09-03 RX ORDER — NITROFURANTOIN 25; 75 MG/1; MG/1
100 CAPSULE ORAL 2 TIMES DAILY
Qty: 10 CAPSULE | Refills: 0 | Status: SHIPPED | OUTPATIENT
Start: 2024-09-03 | End: 2024-09-08

## 2024-09-03 NOTE — PATIENT INSTRUCTIONS
Dear Aura Hope    After reviewing your responses, I've been able to diagnose you with a urinary tract infection, which is a common infection of the bladder with bacteria.  This is not a sexually transmitted infection, though urinating immediately after intercourse can help prevent infections.  Drinking lots of fluids is also helpful to clear your current infection and prevent the next one.      I have sent a prescription for antibiotics to your pharmacy to treat this infection.    It is important that you take all of your prescribed medication even if your symptoms are improving after a few doses.  Taking all of your medicine helps prevent the symptoms from returning.     If your symptoms worsen, you develop pain in your back or stomach, develop fevers, or are not improving in 5 days, please contact your primary care provider for an appointment or visit any of our convenient Walk-in or Urgent Care Centers to be seen, which can be found on our website here.    Thanks again for choosing us as your health care partner,    Jimmy Townsend MD  Urinary Tract Infection (UTI) in Women: Care Instructions  Overview     A urinary tract infection (UTI) is an infection caused by bacteria. It can happen anywhere in the urinary tract. A UTI can happen in the:  Kidneys.  Ureters, the tubes that connect the kidneys to the bladder.  Bladder.  Urethra, where the urine comes out.  Most UTIs are bladder infections. They often cause pain or burning when you urinate.  Most UTIs can be cured with antibiotics. If you are prescribed antibiotics, be sure to complete your treatment so that the infection does not get worse.  Follow-up care is a key part of your treatment and safety. Be sure to make and go to all appointments, and call your doctor if you are having problems. It's also a good idea to know your test results and keep a list of the medicines you take.  How can you care for yourself at home?  Take your antibiotics as directed.  "Do not stop taking them just because you feel better. You need to take the full course of antibiotics.  Drink extra water and other fluids for the next day or two. This will help make the urine less concentrated and help wash out the bacteria that are causing the infection. (If you have kidney, heart, or liver disease and have to limit fluids, talk with your doctor before you increase the amount of fluids you drink.)  Avoid drinks that are carbonated or have caffeine. They can irritate the bladder.  Urinate often. Try to empty your bladder each time.  To relieve pain, take a hot bath or lay a heating pad set on low over your lower belly or genital area. Never go to sleep with a heating pad in place.  To prevent UTIs  Drink plenty of water each day. This helps you urinate often, which clears bacteria from your system. (If you have kidney, heart, or liver disease and have to limit fluids, talk with your doctor before you increase the amount of fluids you drink.)  Urinate when you need to.  If you are sexually active, urinate right after you have sex.  Change sanitary pads often.  Avoid douches, bubble baths, feminine hygiene sprays, and other feminine hygiene products that have deodorants.  After going to the bathroom, wipe from front to back.  When should you call for help?   Call your doctor now or seek immediate medical care if:    You have new or worse fever, chills, nausea, or vomiting.     You have new pain in your back just below your rib cage. This is called flank pain.     There is new blood or pus in your urine.     You have any problems with your antibiotic medicine.   Watch closely for changes in your health, and be sure to contact your doctor if:    You are not getting better after taking an antibiotic for 2 days.     Your symptoms go away but then come back.   Where can you learn more?  Go to https://www.healthwise.net/patiented  Enter K848 in the search box to learn more about \"Urinary Tract Infection " "(UTI) in Women: Care Instructions.\"  Current as of: November 15, 2023               Content Version: 14.0    4773-2400 [employer].   Care instructions adapted under license by your healthcare professional. If you have questions about a medical condition or this instruction, always ask your healthcare professional. [employer] disclaims any warranty or liability for your use of this information.      "

## 2024-10-01 DIAGNOSIS — B00.9 HERPES SIMPLEX VIRUS INFECTION: ICD-10-CM

## 2024-10-02 RX ORDER — VALACYCLOVIR HYDROCHLORIDE 500 MG/1
500 TABLET, FILM COATED ORAL 2 TIMES DAILY
Qty: 6 TABLET | Refills: 6 | OUTPATIENT
Start: 2024-10-02 | End: 2024-10-05

## 2024-10-22 ENCOUNTER — E-VISIT (OUTPATIENT)
Dept: URGENT CARE | Facility: CLINIC | Age: 39
End: 2024-10-22
Payer: COMMERCIAL

## 2024-10-22 DIAGNOSIS — N39.0 ACUTE UTI (URINARY TRACT INFECTION): Primary | ICD-10-CM

## 2024-10-22 PROCEDURE — 99207 PR NON-BILLABLE SERV PER CHARTING: CPT | Performed by: EMERGENCY MEDICINE

## 2024-10-22 RX ORDER — NITROFURANTOIN 25; 75 MG/1; MG/1
100 CAPSULE ORAL 2 TIMES DAILY
Qty: 10 CAPSULE | Refills: 0 | Status: SHIPPED | OUTPATIENT
Start: 2024-10-22 | End: 2024-10-27

## 2024-10-22 NOTE — PATIENT INSTRUCTIONS
Dear Aura Hope    After reviewing your responses, I've been able to diagnose you with a urinary tract infection, which is a common infection of the bladder with bacteria.  This is not a sexually transmitted infection, though urinating immediately after intercourse can help prevent infections.  Drinking lots of fluids is also helpful to clear your current infection and prevent the next one.      I have sent a prescription for antibiotics to your pharmacy to treat this infection.    It is important that you take all of your prescribed medication even if your symptoms are improving after a few doses.  Taking all of your medicine helps prevent the symptoms from returning.     If your symptoms worsen, you develop pain in your back or stomach, develop fevers, or are not improving in 5 days, please contact your primary care provider for an appointment or visit any of our convenient Walk-in or Urgent Care Centers to be seen, which can be found on our website here.    Thanks again for choosing us as your health care partner,    Jimmy Townsend MD  Urinary Tract Infection (UTI) in Women: Care Instructions  Overview     A urinary tract infection (UTI) is an infection caused by bacteria. It can happen anywhere in the urinary tract. A UTI can happen in the:  Kidneys.  Ureters, the tubes that connect the kidneys to the bladder.  Bladder.  Urethra, where the urine comes out.  Most UTIs are bladder infections. They often cause pain or burning when you urinate.  Most UTIs can be cured with antibiotics. If you are prescribed antibiotics, be sure to complete your treatment so that the infection does not get worse.  Follow-up care is a key part of your treatment and safety. Be sure to make and go to all appointments, and call your doctor if you are having problems. It's also a good idea to know your test results and keep a list of the medicines you take.  How can you care for yourself at home?  Take your antibiotics as directed.  "Do not stop taking them just because you feel better. You need to take the full course of antibiotics.  Drink extra water and other fluids for the next day or two. This will help make the urine less concentrated and help wash out the bacteria that are causing the infection. (If you have kidney, heart, or liver disease and have to limit fluids, talk with your doctor before you increase the amount of fluids you drink.)  Avoid drinks that are carbonated or have caffeine. They can irritate the bladder.  Urinate often. Try to empty your bladder each time.  To relieve pain, take a hot bath or lay a heating pad set on low over your lower belly or genital area. Never go to sleep with a heating pad in place.  To prevent UTIs  Drink plenty of water each day. This helps you urinate often, which clears bacteria from your system. (If you have kidney, heart, or liver disease and have to limit fluids, talk with your doctor before you increase the amount of fluids you drink.)  Urinate when you need to.  If you are sexually active, urinate right after you have sex.  Change sanitary pads often.  Avoid douches, bubble baths, feminine hygiene sprays, and other feminine hygiene products that have deodorants.  After going to the bathroom, wipe from front to back.  When should you call for help?   Call your doctor now or seek immediate medical care if:    You have new or worse fever, chills, nausea, or vomiting.     You have new pain in your back just below your rib cage. This is called flank pain.     There is new blood or pus in your urine.     You have any problems with your antibiotic medicine.   Watch closely for changes in your health, and be sure to contact your doctor if:    You are not getting better after taking an antibiotic for 2 days.     Your symptoms go away but then come back.   Where can you learn more?  Go to https://www.healthwise.net/patiented  Enter K848 in the search box to learn more about \"Urinary Tract Infection " "(UTI) in Women: Care Instructions.\"  Current as of: November 15, 2023  Content Version: 14.2 2024 [facility] [employer], [facility].   Care instructions adapted under license by your healthcare professional. If you have questions about a medical condition or this instruction, always ask your healthcare professional. [employer] disclaims any warranty or liability for your use of this information.    "

## 2024-11-13 ENCOUNTER — PATIENT OUTREACH (OUTPATIENT)
Dept: CARE COORDINATION | Facility: CLINIC | Age: 39
End: 2024-11-13
Payer: COMMERCIAL

## 2024-11-14 ENCOUNTER — E-VISIT (OUTPATIENT)
Dept: URGENT CARE | Facility: CLINIC | Age: 39
End: 2024-11-14
Payer: COMMERCIAL

## 2024-11-14 DIAGNOSIS — R30.0 DIFFICULT OR PAINFUL URINATION: ICD-10-CM

## 2024-11-14 DIAGNOSIS — N30.00 ACUTE CYSTITIS WITHOUT HEMATURIA: Primary | ICD-10-CM

## 2024-11-14 NOTE — PATIENT INSTRUCTIONS
Dear Aura Hope,     After reviewing your responses, I would like you to come in for a urine test to make sure we treat you correctly. This urine test is to evaluate you for a possible urinary tract infection, and should be scheduled for today or tomorrow. Schedule a Lab Only appointment here.     Lab appointments are not available at most locations on the weekends. If no Lab Only appointment is available, you should be seen in any of our convenient Walk-in or Urgent Care Centers, which can be found on our website here.     You will receive instructions with your results in [employer] once they are available.     If your symptoms worsen, you develop pain in your back or stomach, develop fevers, or are not improving in 5 days, please contact your primary care provider for an appointment or visit a Walk-in or Urgent Care Center to be seen.     Thanks again for choosing us as your health care partner,     Maranda Mcdaniels, CNP

## 2024-11-14 NOTE — TELEPHONE ENCOUNTER
After information exchange with patient, Kali love.  JANINE Townsend MD       provider E-Visit time total (minutes): 3

## 2024-11-15 ENCOUNTER — LAB (OUTPATIENT)
Dept: LAB | Facility: CLINIC | Age: 39
End: 2024-11-15
Payer: COMMERCIAL

## 2024-11-15 DIAGNOSIS — R30.0 DIFFICULT OR PAINFUL URINATION: ICD-10-CM

## 2024-11-15 LAB
ALBUMIN UR-MCNC: NEGATIVE MG/DL
APPEARANCE UR: CLEAR
BACTERIA #/AREA URNS HPF: ABNORMAL /HPF
BILIRUB UR QL STRIP: NEGATIVE
CLUE CELLS: ABNORMAL
COLOR UR AUTO: YELLOW
GLUCOSE UR STRIP-MCNC: NEGATIVE MG/DL
HGB UR QL STRIP: NEGATIVE
KETONES UR STRIP-MCNC: NEGATIVE MG/DL
LEUKOCYTE ESTERASE UR QL STRIP: ABNORMAL
NITRATE UR QL: POSITIVE
PH UR STRIP: 5.5 [PH] (ref 5–7)
RBC #/AREA URNS AUTO: ABNORMAL /HPF
SP GR UR STRIP: >=1.03 (ref 1–1.03)
SQUAMOUS #/AREA URNS AUTO: ABNORMAL /LPF
TRICHOMONAS, WET PREP: ABNORMAL
UROBILINOGEN UR STRIP-ACNC: 0.2 E.U./DL
WBC #/AREA URNS AUTO: ABNORMAL /HPF
WBC'S/HIGH POWER FIELD, WET PREP: ABNORMAL
YEAST, WET PREP: ABNORMAL

## 2024-11-15 PROCEDURE — 81001 URINALYSIS AUTO W/SCOPE: CPT

## 2024-11-15 PROCEDURE — 87210 SMEAR WET MOUNT SALINE/INK: CPT

## 2024-11-15 RX ORDER — NITROFURANTOIN 25; 75 MG/1; MG/1
100 CAPSULE ORAL 2 TIMES DAILY
Qty: 10 CAPSULE | Refills: 0 | Status: SHIPPED | OUTPATIENT
Start: 2024-11-15 | End: 2024-11-20

## 2024-11-17 LAB — BACTERIA UR CULT: ABNORMAL

## 2024-12-12 ENCOUNTER — MYC MEDICAL ADVICE (OUTPATIENT)
Dept: FAMILY MEDICINE | Facility: CLINIC | Age: 39
End: 2024-12-12

## 2024-12-12 SDOH — HEALTH STABILITY: PHYSICAL HEALTH: ON AVERAGE, HOW MANY MINUTES DO YOU ENGAGE IN EXERCISE AT THIS LEVEL?: 20 MIN

## 2024-12-12 SDOH — HEALTH STABILITY: PHYSICAL HEALTH: ON AVERAGE, HOW MANY DAYS PER WEEK DO YOU ENGAGE IN MODERATE TO STRENUOUS EXERCISE (LIKE A BRISK WALK)?: 3 DAYS

## 2024-12-12 ASSESSMENT — SOCIAL DETERMINANTS OF HEALTH (SDOH): HOW OFTEN DO YOU GET TOGETHER WITH FRIENDS OR RELATIVES?: ONCE A WEEK

## 2024-12-13 NOTE — TELEPHONE ENCOUNTER
Please see [employer] message regarding her concerns for the upcoming appt.    Thank you    Maranda BETHEA RN

## 2024-12-16 ENCOUNTER — OFFICE VISIT (OUTPATIENT)
Dept: FAMILY MEDICINE | Facility: CLINIC | Age: 39
End: 2024-12-16

## 2024-12-16 VITALS
SYSTOLIC BLOOD PRESSURE: 102 MMHG | TEMPERATURE: 98.8 F | WEIGHT: 118 LBS | HEART RATE: 81 BPM | DIASTOLIC BLOOD PRESSURE: 68 MMHG | RESPIRATION RATE: 16 BRPM | OXYGEN SATURATION: 100 % | BODY MASS INDEX: 21.58 KG/M2

## 2024-12-16 DIAGNOSIS — Z13.1 SCREENING FOR DIABETES MELLITUS: ICD-10-CM

## 2024-12-16 DIAGNOSIS — E61.2 MAGNESIUM DEFICIENCY: ICD-10-CM

## 2024-12-16 DIAGNOSIS — B00.9 HERPES SIMPLEX VIRUS INFECTION: ICD-10-CM

## 2024-12-16 DIAGNOSIS — F41.9 ANXIETY: ICD-10-CM

## 2024-12-16 DIAGNOSIS — E56.9 VITAMIN DEFICIENCY: ICD-10-CM

## 2024-12-16 DIAGNOSIS — Z00.00 ROUTINE GENERAL MEDICAL EXAMINATION AT A HEALTH CARE FACILITY: Primary | ICD-10-CM

## 2024-12-16 PROBLEM — J30.2 SEASONAL ALLERGIC RHINITIS, UNSPECIFIED TRIGGER: Status: RESOLVED | Noted: 2024-07-29 | Resolved: 2024-12-16

## 2024-12-16 PROBLEM — R09.81 CONGESTION OF PARANASAL SINUS: Status: RESOLVED | Noted: 2024-07-29 | Resolved: 2024-12-16

## 2024-12-16 LAB
EST. AVERAGE GLUCOSE BLD GHB EST-MCNC: 88 MG/DL
HBA1C MFR BLD: 4.7 % (ref 0–5.6)
MAGNESIUM SERPL-MCNC: 2.1 MG/DL (ref 1.7–2.3)
VIT B12 SERPL-MCNC: 1089 PG/ML (ref 232–1245)
VIT D+METAB SERPL-MCNC: 25 NG/ML (ref 20–50)

## 2024-12-16 PROCEDURE — 99395 PREV VISIT EST AGE 18-39: CPT | Performed by: NURSE PRACTITIONER

## 2024-12-16 PROCEDURE — 82607 VITAMIN B-12: CPT | Performed by: NURSE PRACTITIONER

## 2024-12-16 PROCEDURE — 83036 HEMOGLOBIN GLYCOSYLATED A1C: CPT | Performed by: NURSE PRACTITIONER

## 2024-12-16 PROCEDURE — 82306 VITAMIN D 25 HYDROXY: CPT | Performed by: NURSE PRACTITIONER

## 2024-12-16 PROCEDURE — 99214 OFFICE O/P EST MOD 30 MIN: CPT | Mod: 25 | Performed by: NURSE PRACTITIONER

## 2024-12-16 PROCEDURE — 36415 COLL VENOUS BLD VENIPUNCTURE: CPT | Performed by: NURSE PRACTITIONER

## 2024-12-16 PROCEDURE — 83735 ASSAY OF MAGNESIUM: CPT | Performed by: NURSE PRACTITIONER

## 2024-12-16 RX ORDER — VALACYCLOVIR HYDROCHLORIDE 1 G/1
2000 TABLET, FILM COATED ORAL 2 TIMES DAILY
Qty: 40 TABLET | Refills: 1 | Status: SHIPPED | OUTPATIENT
Start: 2024-12-16

## 2024-12-16 RX ORDER — CITALOPRAM HYDROBROMIDE 10 MG/1
10 TABLET ORAL DAILY
Qty: 30 TABLET | Refills: 1 | Status: SHIPPED | OUTPATIENT
Start: 2024-12-16

## 2024-12-16 ASSESSMENT — PAIN SCALES - GENERAL: PAINLEVEL_OUTOF10: NO PAIN (0)

## 2024-12-16 NOTE — PROGRESS NOTES
Preventive Care Visit  [facility]  Maranda Craig NP, Family Medicine  Dec 16, 2024    Assessment & Plan     Routine general medical examination at a health care facility  Patient's Pap was completed in March of last year and showed other HPV positive otherwise was an BINA with negative HPV of 16 and 18.  Patient declines colposcopy since she had a lot of pain with the initial when she had years ago.  I feel that repeating the Pap after 1 year is sufficient and that if there is any changes in the Pap or HPV 16 or 18, that we can we discuss colposcopy testing at that time.  A1c ordered for screening.  Clinical history addressed below.  Reviewed preventive health recommendations advised follow-up in 1 year for annual physical exam.  Patient will make a Pap only exam after March 11 since insurance will likely cover it after this time.    Screening for diabetes mellitus  - Hemoglobin A1c; Future  - Hemoglobin A1c    Herpes simplex virus infection  Refilled Valtrex so that she does not have to pick it up for every flare and that she has some on hand at home for the year.  - valACYclovir (VALTREX) 1000 mg tablet; Take 2 tablets (2,000 mg) by mouth 2 times daily.    Anxiety  Starting Celexa 10 mg daily and have advised that she can increase this up to 2 tabs daily after 1 week if not seeing any improvement in symptoms.  I have advised her to notify me how she is doing with her anxiety via [id number] if it is improving to continue current dosing or to follow-up with a virtual visit if still not seeing any improvement in symptoms.  - citalopram (CELEXA) 10 MG tablet; Take 1 tablet (10 mg) by mouth daily.    Vitamin deficiency  Vitamin D and B12 ordered on patient's request for concerns of vitamin deficiency.  - Vitamin D Deficiency; Future  - Vitamin B12; Future  - Vitamin D Deficiency  - Vitamin B12    Magnesium deficiency  Magnesium ordered on patient's request for concerns for magnesium  deficiency.  - Magnesium; Future  - Magnesium    Patient has been advised of split billing requirements and indicates understanding: Yes      See Patient Instructions    Subjective   Aura is a 39 year old, presenting for the following:  Physical (Does not want pap at this time or covid or flu immunizations today )        12/16/2024    11:10 AM   Additional Questions   Roomed by rmb   Accompanied by self         12/16/2024    11:10 AM   Patient Reported Additional Medications   Patient reports taking the following new medications none          HPI    Health Care Directive  Patient does not have a Health Care Directive: Discussed advance care planning with patient; information given to patient to review.      12/12/2024   General Health   How would you rate your overall physical health? Good   Feel stress (tense, anxious, or unable to sleep) Very much      (!) STRESS CONCERN      12/12/2024   Nutrition   Three or more servings of calcium each day? Yes   Diet: Regular (no restrictions)   How many servings of fruit and vegetables per day? (!) 0-1   How many sweetened beverages each day? (!) 2            12/12/2024   Exercise   Days per week of moderate/strenous exercise 3 days   Average minutes spent exercising at this level 20 min            12/12/2024   Social Factors   Frequency of gathering with friends or relatives Once a week   Worry food won't last until get money to buy more No   Food not last or not have enough money for food? No   Do you have housing? (Housing is defined as stable permanent housing and does not include staying ouside in a car, in a tent, in an abandoned building, in an overnight shelter, or couch-surfing.) Yes   Are you worried about losing your housing? No   Lack of transportation? No   Unable to get utilities (heat,electricity)? No            12/12/2024   Dental   Dentist two times every year? Yes            12/12/2024   TB Screening   Were you born outside of the [place]? No      Today's PHQ-2  "Score:       1/19/2024    11:43 AM   PHQ-2 ( 1999 [employer])   Q1: Little interest or pleasure in doing things 0    Q2: Feeling down, depressed or hopeless 0    PHQ-2 Score 0   Q1: Little interest or pleasure in doing things Not at all   Q2: Feeling down, depressed or hopeless Not at all   PHQ-2 Score 0       Patient-reported         12/12/2024   Substance Use   Alcohol more than 3/day or more than 7/wk No   Do you use any other substances recreationally? No        Social History     Tobacco Use    Smoking status: Never     Passive exposure: Never    Smokeless tobacco: Never    Tobacco comments:     Occasional to be \"cool\" as a teenager   Vaping Use    Vaping status: Never Used   Substance Use Topics    Alcohol use: Yes     Comment: rarely    Drug use: Never     Mammogram Screening - Patient under 40 years of age: Routine Mammogram Screening not recommended.         12/12/2024   STI Screening   New sexual partner(s) since last STI/HIV test? (!) YES Back together with ex-boyfriend        History of abnormal Pap smear: last PAP recommended Colposcopy, patient does not want to do one due to pain with procedure.  Currently she only had HPV other positive.  Will plan to repeat and monitor yearly.        Latest Ref Rng & Units 3/11/2024     2:04 PM 8/27/2019    12:00 AM   PAP / HPV   PAP  Negative for Intraepithelial Lesion or Malignancy (NILM)     HPV 16 DNA Negative Negative     HPV 18 DNA Negative Negative     HPV_EXT - HISTORICAL   See Scanned Report    Other HR HPV Negative Positive     PAP-ABSTRACT   See Scanned Document           This result is from an external source.           12/12/2024   Contraception/Family Planning   Questions about contraception or family planning No      Reviewed and updated as needed this visit by Provider   Tobacco  Allergies  Meds  Problems  Med Hx  Surg Hx  Fam Hx  Soc   Hx Sexual Activity          Past Medical History:   Diagnosis Date    Abnormal Pap smear of cervix 08/01/2019    " "Acute headache     Anxiety     Classic migraine     Endometriosis     Genital herpes     Hypothyroidism      Past Surgical History:   Procedure Laterality Date     SECTION N/A 2017    Procedure:  SECTION;  Surgeon: Denise Enamorado MD;  [facility]: [facility];  Service:     COLPOSCOPY  2015    CIN1    WRIST SURGERY  2007    cyst removed from wrist     Lab work is in process  Labs reviewed in EPIC  BP Readings from Last 3 Encounters:   24 102/68   24 112/64   24 134/77    Wt Readings from Last 3 Encounters:   24 53.5 kg (118 lb)   24 54.6 kg (120 lb 6.4 oz)   24 53.5 kg (118 lb)                  Patient Active Problem List   Diagnosis    Herpes simplex virus infection    ASCUS with positive high risk HPV cervical     Past Surgical History:   Procedure Laterality Date     SECTION N/A 2017    Procedure:  SECTION;  Surgeon: Denise Enamorado MD;  Location: [facility];  Service:     COLPOSCOPY  2015    CIN1    WRIST SURGERY  2007    cyst removed from wrist       Social History     Tobacco Use    Smoking status: Never     Passive exposure: Never    Smokeless tobacco: Never    Tobacco comments:     Occasional to be \"cool\" as a teenager   Substance Use Topics    Alcohol use: Yes     Comment: rarely     Family History   Problem Relation Age of Onset    Heart Disease Mother     Thyroid Disease Mother     Deep Vein Thrombosis Mother     Unknown/Adopted Father     Deep Vein Thrombosis Maternal Grandmother     Hypothyroidism Maternal Grandmother     Unknown/Adopted Paternal Grandmother     Unknown/Adopted Paternal Grandfather     Depression Maternal Half-Brother     Bipolar Disorder Maternal Half-Brother     Anxiety Disorder Maternal Half-Sister     Unknown/Adopted Paternal Half-Brother     Unknown/Adopted Paternal Half-Sister          Current Outpatient Medications   Medication Sig Dispense Refill    Multiple Vitamins-Minerals (DAILY " "MULTI PO)       valACYclovir (VALTREX) 1000 mg tablet Take 2 tablets (2,000 mg) by mouth 2 times daily 4 tablet 5     Allergies   Allergen Reactions    Sulfa Antibiotics Swelling     Recent Labs   Lab Test 03/11/24  1429   A1C 5.0   TSH 2.75        Review of Systems  CONSTITUTIONAL: NEGATIVE for fever, chills, change in weight  INTEGUMENTARY/SKIN: NEGATIVE for worrisome rashes, moles or lesions  EYES: NEGATIVE for vision changes or irritation  ENT/MOUTH: NEGATIVE for ear, mouth and throat problems  RESP: NEGATIVE for significant cough or SOB  BREAST: NEGATIVE for masses, tenderness or discharge  CV: NEGATIVE for chest pain, palpitations or peripheral edema  GI: NEGATIVE for nausea, abdominal pain, heartburn, or change in bowel habits  : NEGATIVE for frequency, dysuria, or hematuria  MUSCULOSKELETAL:POSITIVE  for shin pain when her kids at school jump on her legs or when standing in the shower or exercising for long periods of time.  NEURO: NEGATIVE for weakness, dizziness or paresthesias  ENDOCRINE: NEGATIVE for temperature intolerance, skin/hair changes  HEME/ALLERGY/IMMUNE: POSITIVE  for easy bruising  PSYCHIATRIC: POSITIVE for increase in anxiety which occurs with no warning like when driving.     Objective    Exam  /68   Pulse 81   Temp 98.8  F (37.1  C) (Tympanic)   Resp 16   Wt 53.5 kg (118 lb)   LMP 12/02/2024 (Approximate)   SpO2 100%   BMI 21.58 kg/m     Estimated body mass index is 21.58 kg/m  as calculated from the following:    Height as of 6/17/24: 1.575 m (5' 2\").    Weight as of this encounter: 53.5 kg (118 lb).    Physical Exam  GENERAL: alert and no distress  EYES: Eyes grossly normal to inspection, PERRL and conjunctivae and sclerae normal  HENT: ear canals and TM's normal, nose and mouth without ulcers or lesions  NECK: no adenopathy, no asymmetry, masses, or scars  RESP: lungs clear to auscultation - no rales, rhonchi or wheezes  CV: regular rate and rhythm, normal S1 S2, no S3 or " S4, no murmur, click or rub, no peripheral edema  ABDOMEN: soft, nontender, no hepatosplenomegaly, no masses and bowel sounds normal  MS: no gross musculoskeletal defects noted, no edema  SKIN: no suspicious lesions or rashes  NEURO: Normal strength and tone, mentation intact and speech normal  PSYCH: mentation appears normal, affect normal/bright  LYMPH: normal ant/post cervical, supraclavicular nodes    Signed Electronically by: Maranda Craig, NP

## 2024-12-16 NOTE — PATIENT INSTRUCTIONS
Start Celexa 10 mg daily, after one week you can increase if no changes in anxiety.  Follow-up with me via [employer] on how things are going if you want refills and doing well, otherwise make a virtual appointment to check in to discuss symptoms.    Labs today on vitamins, magnesium and diabetes screening.    Make appointment for PAP only on or after 3/11/25.    I refilled your Valtrex out for enough tabs for the year.    Patient Education   Preventive Care Advice   This is general advice given by our system to help you stay healthy. However, your care team may have specific advice just for you. Please talk to your care team about your preventive care needs.  Nutrition  Eat 5 or more servings of fruits and vegetables each day.  Try wheat bread, brown rice and whole grain pasta (instead of white bread, rice, and pasta).  Get enough calcium 1200 mg and vitamin D [id number] international unit(s) daily. Check the label on foods and aim for 100% of the RDA (recommended daily allowance).  Lifestyle  Exercise at least 150 minutes each week  (30 minutes a day, 5 days a week).  Do muscle strengthening activities 2 days a week. These help control your weight and prevent disease.  No smoking.  Wear sunscreen to prevent skin cancer.  Have a dental exam and cleaning every 6 months.  Yearly exams  See your health care team every year to talk about:  Any changes in your health.  Any medicines your care team has prescribed.  Preventive care, family planning, and ways to prevent chronic diseases.  Shots (vaccines)   HPV shots (up to age 26), if you've never had them before.  Hepatitis B shots (up to age 59), if you've never had them before.  COVID-19 shot: Get this shot when it's due.  Flu shot: Get a flu shot every year.  Tetanus shot: Get a tetanus shot every 10 years.  Pneumococcal, hepatitis A, and RSV shots: Ask your care team if you need these based on your risk.  Shingles shot (for age 50 and up)  General health tests  Diabetes  screening:  Starting at age 35, Get screened for diabetes at least every 3 years.  If you are younger than age 35, ask your care team if you should be screened for diabetes.  Cholesterol test: At age 39, start having a cholesterol test every 5 years, or more often if advised.  Bone density scan (DEXA): At age 50, ask your care team if you should have this scan for osteoporosis (brittle bones).  Hepatitis C: Get tested at least once in your life.  STIs (sexually transmitted infections)  Before age 24: Ask your care team if you should be screened for STIs.  After age 24: Get screened for STIs if you're at risk. You are at risk for STIs (including HIV) if:  You are sexually active with more than one person.  You don't use condoms every time.  You or a partner was diagnosed with a sexually transmitted infection.  If you are at risk for HIV, ask about PrEP medicine to prevent HIV.  Get tested for HIV at least once in your life, whether you are at risk for HIV or not.  Cancer screening tests  Cervical cancer screening: If you have a cervix, begin getting regular cervical cancer screening tests starting at age 21.  Breast cancer scan (mammogram): If you've ever had breasts, begin having regular mammograms starting at age 40. This is a scan to check for breast cancer.  Colon cancer screening: It is important to start screening for colon cancer at age 45.  Have a colonoscopy test every 10 years (or more often if you're at risk) Or, ask your provider about stool tests like a FIT test every year or Cologuard test every 3 years.  To learn more about your testing options, visit:   .  For help making a decision, visit:   https://bit.ly/sq92774.  Prostate cancer screening test: If you have a prostate, ask your care team if a prostate cancer screening test (PSA) at age 55 is right for you.  Lung cancer screening: If you are a current or former smoker ages 50 to 80, ask your care team if ongoing lung cancer screenings are right for  you.  For informational purposes only. Not to replace the advice of your health care provider. Copyright   2023 [facility]. All rights reserved. Clinically reviewed by the [facility] Transitions Program. [facility] [id number] - REV 01/24.  Learning About Stress  What is stress?     Stress is your body's response to a hard situation. Your body can have a physical, emotional, or mental response. Stress is a fact of life for most people, and it affects everyone differently. What causes stress for you may not be stressful for someone else.  A lot of things can cause stress. You may feel stress when you go on a job interview, take a test, or run a race. This kind of short-term stress is normal and even useful. It can help you if you need to work hard or react quickly. For example, stress can help you finish an important job on time.  Long-term stress is caused by ongoing stressful situations or events. Examples of long-term stress include long-term health problems, ongoing problems at work, or conflicts in your family. Long-term stress can harm your health.  How does stress affect your health?  When you are stressed, your body responds as though you are in danger. It makes hormones that speed up your heart, make you breathe faster, and give you a burst of energy. This is called the fight-or-flight stress response. If the stress is over quickly, your body goes back to normal and no harm is done.  But if stress happens too often or lasts too long, it can have bad effects. Long-term stress can make you more likely to get sick, and it can make symptoms of some diseases worse. If you tense up when you are stressed, you may develop neck, shoulder, or low back pain. Stress is linked to high blood pressure and heart disease.  Stress also harms your emotional health. It can make you trejo, tense, or depressed. Your relationships may suffer, and you may not do well at work or school.  What can you do to manage  stress?  You can try these things to help manage stress:   Do something active. Exercise or activity can help reduce stress. Walking is a great way to get started. Even everyday activities such as housecleaning or yard work can help.  Try yoga or devante chi. These techniques combine exercise and meditation. You may need some training at first to learn them.  Do something you enjoy. For example, listen to music or go to a movie. Practice your hobby or do volunteer work.  Meditate. This can help you relax, because you are not worrying about what happened before or what may happen in the future.  Do guided imagery. Imagine yourself in any setting that helps you feel calm. You can use online videos, books, or a teacher to guide you.  Do breathing exercises. For example:  From a standing position, bend forward from the waist with your knees slightly bent. Let your arms dangle close to the floor.  Breathe in slowly and deeply as you return to a standing position. Roll up slowly and lift your head last.  Hold your breath for just a few seconds in the standing position.  Breathe out slowly and bend forward from the waist.  Let your feelings out. Talk, laugh, cry, and express anger when you need to. Talking with supportive friends or family, a counselor, or a franco leader about your feelings is a healthy way to relieve stress. Avoid discussing your feelings with people who make you feel worse.  Write. It may help to write about things that are bothering you. This helps you find out how much stress you feel and what is causing it. When you know this, you can find better ways to cope.  What can you do to prevent stress?  You might try some of these things to help prevent stress:  Manage your time. This helps you find time to do the things you want and need to do.  Get enough sleep. Your body recovers from the stresses of the day while you are sleeping.  Get support. Your family, friends, and community can make a difference in how  "you experience stress.  Limit your news feed. Avoid or limit time on social media or news that may make you feel stressed.  Do something active. Exercise or activity can help reduce stress. Walking is a great way to get started.  Where can you learn more?  Go to https://www.[employer].net/patiented  Enter N032 in the search box to learn more about \"Learning About Stress.\"  Current as of: October 24, 2023  Content Version: 14.2 2024 "[employer].   Care instructions adapted under license by your healthcare professional. If you have questions about a medical condition or this instruction, always ask your healthcare professional. [employer] disclaims any warranty or liability for your use of this information.    Safer Sex: Care Instructions  Overview  Safer sex is a way to reduce your risk of getting a sexually transmitted infection (STI). It can also help prevent pregnancy.  Several products can help you practice safer sex and reduce your chance of STIs. One of the best is a condom. There are internal and external condoms. You can use a special rubber sheet (dental dam) for protection during oral sex. Disposable gloves can keep your hands from touching blood, semen, or other body fluids that can carry infections.  Remember that birth control methods such as diaphragms, IUDs, foams, and birth control pills do not stop you from getting STIs.  Follow-up care is a key part of your treatment and safety. Be sure to make and go to all appointments, and call your doctor if you are having problems. It's also a good idea to know your test results and keep a list of the medicines you take.  How can you care for yourself at home?  Think about getting vaccinated to help prevent hepatitis A, hepatitis B, and human papillomavirus (HPV). They can be spread through sex.  Use a condom every time you have sex. Use an external condom, which goes on the penis. Or use an internal condom, which goes into the " "vagina or anus.  Make sure you use the right size external condom. A condom that's too small can break easily. A condom that's too big can slip off during sex.  Use a new condom each time you have sex. Be careful not to poke a hole in the condom when you open the wrapper.  Don't use an internal condom and an external condom at the same time.  Never use petroleum jelly (such as Vaseline), grease, hand lotion, baby oil, or anything with oil in it. These products can make holes in the condom.  After intercourse, hold the edge of the condom as you remove it. This will help keep semen from spilling out of the condom.  Do not have sex with anyone who has symptoms of an STI, such as sores on the genitals or mouth.  Do not drink a lot of alcohol or use drugs before sex.  Limit your sex partners. Sex with one partner who has sex only with you can reduce your risk of getting an STI.  Don't share sex toys. But if you do share them, use a condom and clean the sex toys between each use.  Talk to any partners before you have sex. Talk about what you feel comfortable with and whether you have any boundaries with sex. And find out if your partner or partners may be at risk for any STI. Keep in mind that a person may be able to spread an STI even if they do not have symptoms. You and any partners may want to get tested for STIs.  Where can you learn more?  Go to https://www.[employer].net/patiented  Enter B608 in the search box to learn more about \"Safer Sex: Care Instructions.\"  Current as of: November 27, 2023  Content Version: 14.2 2024 [facility] [employer].   Care instructions adapted under license by your healthcare professional. If you have questions about a medical condition or this instruction, always ask your healthcare professional. [employer]wise[employer] disclaims any warranty or liability for your use of this information.       "

## 2024-12-23 ENCOUNTER — E-VISIT (OUTPATIENT)
Dept: URGENT CARE | Facility: CLINIC | Age: 39
End: 2024-12-23

## 2024-12-23 ENCOUNTER — LAB (OUTPATIENT)
Dept: LAB | Facility: CLINIC | Age: 39
End: 2024-12-23
Attending: PREVENTIVE MEDICINE

## 2024-12-23 DIAGNOSIS — J02.9 SORE THROAT: ICD-10-CM

## 2024-12-23 DIAGNOSIS — J02.9 SORE THROAT: Primary | ICD-10-CM

## 2024-12-23 DIAGNOSIS — J02.0 STREP PHARYNGITIS: Primary | ICD-10-CM

## 2024-12-23 LAB — DEPRECATED S PYO AG THROAT QL EIA: POSITIVE

## 2024-12-23 PROCEDURE — 87880 STREP A ASSAY W/OPTIC: CPT

## 2024-12-23 PROCEDURE — 87635 SARS-COV-2 COVID-19 AMP PRB: CPT

## 2024-12-23 RX ORDER — AMOXICILLIN 500 MG/1
500 TABLET, FILM COATED ORAL 2 TIMES DAILY
Qty: 20 TABLET | Refills: 0 | Status: SHIPPED | OUTPATIENT
Start: 2024-12-23 | End: 2025-01-02

## 2024-12-23 NOTE — PATIENT INSTRUCTIONS
Strep Throat: Care Instructions  Overview     Strep throat is a bacterial infection that causes sudden, severe sore throat and fever. Symptoms may include red, swollen tonsils that may have white or yellow patches. A strep test may be needed to tell if the sore throat is caused by strep bacteria. Treatment can help ease symptoms and may prevent future problems.  Follow-up care is a key part of your treatment and safety. Be sure to make and go to all appointments, and call your doctor if you are having problems. It's also a good idea to know your test results and keep a list of the medicines you take.  How can you care for yourself at home?  Take your antibiotics as directed. Do not stop taking them just because you feel better. You need to take the full course of antibiotics.  Strep throat can spread to others until 24 hours after you begin taking antibiotics. During this time, avoid contact with other people at work, school, or home, especially infants and children. Do not sneeze or cough on others, and wash your hands often. Keep your drinking glass and eating utensils separate from those of others. Wash these items well in hot, soapy water.  Gargle with warm salt water several times a day to help reduce swelling and relieve pain. Mix 1/2 teaspoon of salt in 1 cup of warm water.  Take an over-the-counter pain medication, such as acetaminophen (Tylenol), ibuprofen (Advil, Motrin), or naproxen (Aleve). Read and follow all instructions on the label.  Try an over-the-counter anesthetic throat spray or throat lozenges, which may help relieve throat pain.  Drink plenty of fluids. Fluids may help soothe an irritated throat. Hot fluids, such as tea or soup, may help your throat feel better.  Eat soft solids and drink plenty of liquids. Flavored ice pops, ice cream, scrambled eggs, sherbet, and gelatin dessert (such as Jell-O) may also soothe the throat.  Get lots of rest.  If you smoke, try to quit. If you can't quit, cut  "back as much as you can. Smoking can interfere with healing. If you need help quitting, talk to your doctor about stop-smoking programs and medicines. These can increase your chances of quitting for good.  Use a vaporizer or humidifier to add moisture to the air where you sleep. Follow the directions for cleaning the machine.  When should you call for help?   Call your doctor now or seek immediate medical care if:    You have new or worse symptoms of infection, such as:  A new or higher fever.  A fever with a stiff neck or severe headache.  New or worse trouble swallowing.  Pain that becomes much worse on one side of your throat.   Watch closely for changes in your health, and be sure to contact your doctor if:    You are not getting better after 2 days (48 hours) after taking an antibiotic.   Where can you learn more?  Go to https://www.[employer].net/patiented  Enter K625 in the search box to learn more about \"Strep Throat: Care Instructions.\"  Current as of: September 27, 2023  Content Version: 14.3    2024 [email or web address].   Care instructions adapted under license by your healthcare professional. If you have questions about a medical condition or this instruction, always ask your healthcare professional. [email or web address] disclaims any warranty or liability for your use of this information.    "

## 2024-12-23 NOTE — PATIENT INSTRUCTIONS
Ryan Chavarria,    After reviewing your responses, I would like you to come in for a swab to make sure we treat you correctly. This swab is to evaluate you for possible COVID and Strep Throat, and should be scheduled for today or tomorrow. Please use the Schedule Now button in [employer] to schedule your swab. Otherwise, click this link to schedule a lab only appointment.    Lab appointments are not available at most locations on the weekends. If no Lab Only appointment is available, you should be seen in any of our convenient Urgent Care Centers for an in person visit, which can be found on our website here.    You will receive instructions with your results in [employer] once they are available.     If your symptoms worsen, you develop difficulty breathing, difficulty with drinking enough to stay hydrated, difficulty swallowing your saliva or have fevers for more than 5 days, please contact your primary care provider for an appointment or visit an Urgent Care Center to be seen.      Thanks again for choosing us as your health care partner.   James Hannah MD, MD  Sore Throat: Care Instructions  Overview     Infection by bacteria or a virus causes most sore throats. Cigarette smoke, dry air, air pollution, allergies, and yelling can also cause a sore throat. Sore throats can be painful and annoying. Fortunately, most sore throats go away on their own. If you have a bacterial infection, your doctor may prescribe antibiotics.  Follow-up care is a key part of your treatment and safety. Be sure to make and go to all appointments, and call your doctor if you are having problems. It's also a good idea to know your test results and keep a list of the medicines you take.  How can you care for yourself at home?  If your doctor prescribed antibiotics, take them as directed. Do not stop taking them just because you feel better. You need to take the full course of antibiotics.  Gargle with warm salt water several  "times a day to help reduce swelling and relieve pain. Mix 1/2 teaspoon of salt in 1 cup of warm water.  Take an over-the-counter pain medicine, such as acetaminophen (Tylenol), ibuprofen (Advil, Motrin), or naproxen (Aleve). Read and follow all instructions on the label.  Be careful when taking over-the-counter cold or flu medicines and Tylenol at the same time. Many of these medicines have acetaminophen, which is Tylenol. Read the labels to make sure that you are not taking more than the recommended dose. Too much acetaminophen (Tylenol) can be harmful.  Drink plenty of fluids. Fluids may help soothe an irritated throat. Hot fluids, such as tea or soup, may help decrease throat pain.  Use over-the-counter throat lozenges to soothe pain. Regular cough drops or hard candy may also help. These should not be given to young children because of the risk of choking.  Do not smoke or allow others to smoke around you. If you need help quitting, talk to your doctor about stop-smoking programs and medicines. These can increase your chances of quitting for good.  Use a vaporizer or humidifier to add moisture to your bedroom. Follow the directions for cleaning the machine.  When should you call for help?   Call your doctor now or seek immediate medical care if:    You have trouble breathing.     Your sore throat gets much worse on one side.     You have new or worse trouble swallowing.     You have a new or higher fever.   Watch closely for changes in your health, and be sure to contact your doctor if you do not get better as expected.  Where can you learn more?  Go to https://www.[employer].net/patiented  Enter U420 in the search box to learn more about \"Sore Throat: Care Instructions.\"  Current as of: September 27, 2023  Content Version: 14.3    2024 [employer].   Care instructions adapted under license by your healthcare professional. If you have questions about a medical condition or this instruction, always ask " your healthcare professional. [employer] disclaims any warranty or liability for your use of this information.

## 2024-12-24 LAB — SARS-COV-2 RNA RESP QL NAA+PROBE: NEGATIVE

## 2025-01-08 ENCOUNTER — TELEPHONE (OUTPATIENT)
Dept: FAMILY MEDICINE | Facility: CLINIC | Age: 40
End: 2025-01-08

## 2025-01-08 DIAGNOSIS — B00.9 HERPES SIMPLEX VIRUS INFECTION: ICD-10-CM

## 2025-01-08 RX ORDER — VALACYCLOVIR HYDROCHLORIDE 1 G/1
2000 TABLET, FILM COATED ORAL 2 TIMES DAILY
Qty: 40 TABLET | Refills: 1 | Status: SHIPPED | OUTPATIENT
Start: 2025-01-08

## 2025-01-08 NOTE — TELEPHONE ENCOUNTER
Patient called due to needing her Veltrex moved to the [facility] pharmacy.     RN reordered medication as written and patient has not picked up the 12/16/24 prescription yet.     Xochitl Pillai RN on 1/8/2025 at 2:51 PM

## 2025-01-12 ENCOUNTER — HEALTH MAINTENANCE LETTER (OUTPATIENT)
Age: 40
End: 2025-01-12

## 2025-02-01 ENCOUNTER — MYC REFILL (OUTPATIENT)
Dept: FAMILY MEDICINE | Facility: CLINIC | Age: 40
End: 2025-02-01
Payer: COMMERCIAL

## 2025-02-01 DIAGNOSIS — F41.9 ANXIETY: ICD-10-CM

## 2025-02-05 RX ORDER — CITALOPRAM HYDROBROMIDE 10 MG/1
10 TABLET ORAL DAILY
Qty: 30 TABLET | Refills: 1 | Status: SHIPPED | OUTPATIENT
Start: 2025-02-05

## 2025-04-22 ENCOUNTER — OFFICE VISIT (OUTPATIENT)
Dept: FAMILY MEDICINE | Facility: CLINIC | Age: 40
End: 2025-04-22
Payer: COMMERCIAL

## 2025-04-22 VITALS
HEIGHT: 62 IN | WEIGHT: 117 LBS | HEART RATE: 88 BPM | BODY MASS INDEX: 21.53 KG/M2 | SYSTOLIC BLOOD PRESSURE: 112 MMHG | TEMPERATURE: 98.8 F | DIASTOLIC BLOOD PRESSURE: 68 MMHG | OXYGEN SATURATION: 100 % | RESPIRATION RATE: 14 BRPM

## 2025-04-22 DIAGNOSIS — Z12.31 VISIT FOR SCREENING MAMMOGRAM: ICD-10-CM

## 2025-04-22 DIAGNOSIS — F32.1 CURRENT MODERATE EPISODE OF MAJOR DEPRESSIVE DISORDER WITHOUT PRIOR EPISODE (H): ICD-10-CM

## 2025-04-22 DIAGNOSIS — Z12.4 CERVICAL CANCER SCREENING: Primary | ICD-10-CM

## 2025-04-22 DIAGNOSIS — Z13.220 SCREENING FOR HYPERLIPIDEMIA: ICD-10-CM

## 2025-04-22 DIAGNOSIS — Z11.3 SCREEN FOR STD (SEXUALLY TRANSMITTED DISEASE): ICD-10-CM

## 2025-04-22 DIAGNOSIS — F41.9 ANXIETY: ICD-10-CM

## 2025-04-22 DIAGNOSIS — B00.9 HERPES SIMPLEX VIRUS INFECTION: ICD-10-CM

## 2025-04-22 DIAGNOSIS — Z13.6 SCREENING FOR CARDIOVASCULAR CONDITION: ICD-10-CM

## 2025-04-22 LAB
C TRACH DNA SPEC QL NAA+PROBE: NEGATIVE
CHOLEST SERPL-MCNC: 210 MG/DL
FASTING STATUS PATIENT QL REPORTED: NO
HCV AB SERPL QL IA: NONREACTIVE
HDLC SERPL-MCNC: 71 MG/DL
HIV 1+2 AB+HIV1 P24 AG SERPL QL IA: NONREACTIVE
LDLC SERPL CALC-MCNC: 121 MG/DL
N GONORRHOEA DNA SPEC QL NAA+PROBE: NEGATIVE
NONHDLC SERPL-MCNC: 139 MG/DL
SPECIMEN TYPE: NORMAL
SPECIMEN TYPE: NORMAL
TRIGL SERPL-MCNC: 88 MG/DL

## 2025-04-22 PROCEDURE — 86780 TREPONEMA PALLIDUM: CPT | Performed by: NURSE PRACTITIONER

## 2025-04-22 PROCEDURE — 87591 N.GONORRHOEAE DNA AMP PROB: CPT | Performed by: NURSE PRACTITIONER

## 2025-04-22 PROCEDURE — 87491 CHLMYD TRACH DNA AMP PROBE: CPT | Performed by: NURSE PRACTITIONER

## 2025-04-22 PROCEDURE — 99214 OFFICE O/P EST MOD 30 MIN: CPT | Performed by: NURSE PRACTITIONER

## 2025-04-22 PROCEDURE — 86803 HEPATITIS C AB TEST: CPT | Performed by: NURSE PRACTITIONER

## 2025-04-22 PROCEDURE — 36415 COLL VENOUS BLD VENIPUNCTURE: CPT | Performed by: NURSE PRACTITIONER

## 2025-04-22 PROCEDURE — 80061 LIPID PANEL: CPT | Performed by: NURSE PRACTITIONER

## 2025-04-22 PROCEDURE — 87389 HIV-1 AG W/HIV-1&-2 AB AG IA: CPT | Performed by: NURSE PRACTITIONER

## 2025-04-22 PROCEDURE — 87624 HPV HI-RISK TYP POOLED RSLT: CPT | Performed by: NURSE PRACTITIONER

## 2025-04-22 RX ORDER — VALACYCLOVIR HYDROCHLORIDE 1 G/1
2000 TABLET, FILM COATED ORAL 2 TIMES DAILY
Qty: 40 TABLET | Refills: 1 | Status: SHIPPED | OUTPATIENT
Start: 2025-04-22

## 2025-04-22 RX ORDER — CITALOPRAM HYDROBROMIDE 20 MG/1
20 TABLET ORAL DAILY
Qty: 90 TABLET | Refills: 0 | Status: SHIPPED | OUTPATIENT
Start: 2025-04-22

## 2025-04-22 ASSESSMENT — ANXIETY QUESTIONNAIRES
6. BECOMING EASILY ANNOYED OR IRRITABLE: NEARLY EVERY DAY
IF YOU CHECKED OFF ANY PROBLEMS ON THIS QUESTIONNAIRE, HOW DIFFICULT HAVE THESE PROBLEMS MADE IT FOR YOU TO DO YOUR WORK, TAKE CARE OF THINGS AT HOME, OR GET ALONG WITH OTHER PEOPLE: SOMEWHAT DIFFICULT
GAD7 TOTAL SCORE: 11
1. FEELING NERVOUS, ANXIOUS, OR ON EDGE: NEARLY EVERY DAY
5. BEING SO RESTLESS THAT IT IS HARD TO SIT STILL: NOT AT ALL
7. FEELING AFRAID AS IF SOMETHING AWFUL MIGHT HAPPEN: SEVERAL DAYS
GAD7 TOTAL SCORE: 11
3. WORRYING TOO MUCH ABOUT DIFFERENT THINGS: NEARLY EVERY DAY
2. NOT BEING ABLE TO STOP OR CONTROL WORRYING: SEVERAL DAYS

## 2025-04-22 ASSESSMENT — PATIENT HEALTH QUESTIONNAIRE - PHQ9
5. POOR APPETITE OR OVEREATING: NOT AT ALL
SUM OF ALL RESPONSES TO PHQ QUESTIONS 1-9: 10

## 2025-04-22 ASSESSMENT — PAIN SCALES - GENERAL: PAINLEVEL_OUTOF10: NO PAIN (0)
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
## 2025-04-22 NOTE — PATIENT INSTRUCTIONS
I will notify you of Pap results and plan.  Increase Celexa from 10 mg to 20 mg.  Let me know if there is any side effects.  Plan to follow-up in 1 month virtual visit just to check and see how things are going and for refills due to medication dosing change.  I put in a referral for counseling and they should reach out to you for scheduling.  I refilled the Valtrex for as needed use with cold sores.  Make sure you are taking 2 tablets in the morning in the evening when it occurs.  If you need to continue treatment then take 1 tablet twice a day for up to 7 to 10 days.   Unable to assess

## 2025-04-23 ENCOUNTER — PATIENT OUTREACH (OUTPATIENT)
Dept: CARE COORDINATION | Facility: CLINIC | Age: 40
End: 2025-04-23
Payer: COMMERCIAL

## 2025-04-23 LAB
HPV HR 12 DNA CVX QL NAA+PROBE: NEGATIVE
HPV16 DNA CVX QL NAA+PROBE: NEGATIVE
HPV18 DNA CVX QL NAA+PROBE: NEGATIVE
HUMAN PAPILLOMA VIRUS FINAL DIAGNOSIS: NORMAL
T PALLIDUM AB SER QL: NONREACTIVE

## 2025-04-29 ENCOUNTER — PATIENT OUTREACH (OUTPATIENT)
Dept: FAMILY MEDICINE | Facility: CLINIC | Age: 40
End: 2025-04-29
Payer: COMMERCIAL

## 2025-04-29 LAB
BKR AP ASSOCIATED HPV REPORT: ABNORMAL
BKR LAB AP GYN ADEQUACY: ABNORMAL
BKR LAB AP GYN INTERPRETATION: ABNORMAL
BKR LAB AP LMP: ABNORMAL
BKR LAB AP PREVIOUS ABNL DX: ABNORMAL
BKR LAB AP PREVIOUS ABNORMAL: ABNORMAL
PATH REPORT.COMMENTS IMP SPEC: ABNORMAL
PATH REPORT.COMMENTS IMP SPEC: ABNORMAL
PATH REPORT.RELEVANT HX SPEC: ABNORMAL

## 2025-05-01 ENCOUNTER — VIRTUAL VISIT (OUTPATIENT)
Dept: BEHAVIORAL HEALTH | Facility: CLINIC | Age: 40
End: 2025-05-01
Attending: NURSE PRACTITIONER
Payer: COMMERCIAL

## 2025-05-01 DIAGNOSIS — F41.9 ANXIETY: ICD-10-CM

## 2025-05-01 DIAGNOSIS — F32.1 CURRENT MODERATE EPISODE OF MAJOR DEPRESSIVE DISORDER WITHOUT PRIOR EPISODE (H): ICD-10-CM

## 2025-05-01 DIAGNOSIS — F32.0 CURRENT MILD EPISODE OF MAJOR DEPRESSIVE DISORDER WITHOUT PRIOR EPISODE: Primary | ICD-10-CM

## 2025-05-01 PROCEDURE — 90832 PSYTX W PT 30 MINUTES: CPT | Mod: 95

## 2025-05-01 ASSESSMENT — PATIENT HEALTH QUESTIONNAIRE - PHQ9
10. IF YOU CHECKED OFF ANY PROBLEMS, HOW DIFFICULT HAVE THESE PROBLEMS MADE IT FOR YOU TO DO YOUR WORK, TAKE CARE OF THINGS AT HOME, OR GET ALONG WITH OTHER PEOPLE: SOMEWHAT DIFFICULT
SUM OF ALL RESPONSES TO PHQ QUESTIONS 1-9: 6
SUM OF ALL RESPONSES TO PHQ QUESTIONS 1-9: 6

## 2025-05-01 NOTE — PROGRESS NOTES
[facility]: Integrated Behavioral Health    Integrated Behavioral Health   Mental Health & Addiction Services      Progress Note - Initial [facility] Visit     Patient Name: Aura Hope    Date: May 1, 2025  Service Type: Individual   Visit Start Time:  1:32pm  Visit End Time:  ***   Attendees: Patient   Service Modality: Video Visit:      Provider verified identity through the following two step process.  Patient provided:  Patient photo and Patient     Telemedicine Visit: The patient's condition can be safely assessed and treated via synchronous audio and visual telemedicine encounter.      Reason for Telemedicine Visit: Patient has requested telehealth visit    Originating Site (Patient Location): Patient's home    Distant Site (Provider Location): [facility]    Consent:  The patient/guardian has verbally consented to: the potential risks and benefits of telemedicine (video visit) versus in person care; bill my insurance or make self-payment for services provided; and responsibility for payment of non-covered services.     Patient would like the video invitation sent by:  My Chart    Mode of Communication:  Video Conference via [facility]    Distant Location (Provider):  On-site    As the provider I attest to compliance with applicable laws and regulations related to telemedicine.     [facility] Visit Activities (Refresh list every visit): NEW and [facility] Only       DATA:     Interactive Complexity: No   Crisis: No     Assessments completed:     The following assessments were completed by patient for this visit:  PHQ2:   Phq2 (    [employer],All Rights Reserved. Used With Permission. Developed By Veronica Culver,Yvonne Moreno,[employer] And Colleagues,With An Educational Hong From [employer].)    2025  8:22 AM CDT - Filed by Patient   The following questionnaire should only be answered by the patient. Are you the  patient? Yes   Over the last 2 weeks, how often have you been bothered by any of the following problems?    Q1: Little interest or pleasure in doing things Several days   Q2: Feeling down, depressed or hopeless Several days   PHQ2 (   1999 [employer],ALL RIGHTS RESERVED. USED WITH PERMISSION. DEVELOPED BY ADIN PRASAD,BARB COLÓN,PAVITHRA CARLIN AND COLLEAGUES,WITH AN EDUCATIONAL DUGLAS FROM [employer].)    PHQ-2 Score (range: 0 - 6) 2     PHQ9:       2/6/2024     3:19 PM 4/22/2025    10:38 AM 5/1/2025     1:17 PM   PHQ-9 SCORE   PHQ-9 Total Score MyChart   6 (Mild depression)   PHQ-9 Total Score 6 10 6        Patient-reported     GAD2:       5/1/2025     1:17 PM   GREGORY-2   Feeling nervous, anxious, or on edge 1   Not being able to stop or control worrying 1   GREGORY-2 Total Score 2        Patient-reported     GAD7:       2/6/2024     3:19 PM 4/22/2025    10:38 AM   GREGORY-7 SCORE   Total Score 7 11     CAGE-AID:       5/1/2025     1:18 PM   CAGE-AID Total Score   Total Score 0    Total Score MyChart 0 (A total score of 2 or greater is considered clinically significant)       Patient-reported     PROMIS 10-Global Health (only subscores and total score):       5/1/2025     1:18 PM   PROMIS-10 Scores Only   Global Mental Health Score 9    Global Physical Health Score 15    PROMIS TOTAL - SUBSCORES 24        Patient-reported     [place] Suicide Severity Rating Scale (Lifetime/Recent)      5/1/2025     1:56 PM   [place] Suicide Severity Rating (Lifetime/Recent)   1. Wish to be Dead (Lifetime) N   1. Wish to be Dead (Past 1 Month) N   2. Non-Specific Active Suicidal Thoughts (Lifetime) N   Calculated C-SSRS Risk Score (Lifetime/Recent) No Risk Indicated      Referral:   Patient was referred to [facility] by self and primary care provider.    Reason for referral: determine behavioral health treatment options and assess client readiness and motivation to change.      [facility] introduced self and role. Discussed informed  consent and limits to confidentiality.     Presenting Concerns/ Current Stressors:   ***     Therapeutic Interventions:  {[facility] Interventions:314385}    Response to treatment interventions:   {Patient response to intervention:586206}    Safety Issues and Plan for Safety and Risk Management:     Patient {Risk History:697896}   Patient {PERSONAL SAFETY:311511}   Patient {SUICIDAL IDEATION:067203}   Patient {HOMICIDAL IDEATION:158857}   Patient {SELF INJURIOUS:240405}   Patient {OTHER SAFETY CONCERNS:748261}   {SAFETY PLAN:336505}   Patient reports there are {Firearms:941666}.       ASSESSMENT:   Mental Status:     Appearance:   {Appearance:741290}   Eye Contact:   {Eye Contact:687810}   Psychomotor Behavior: {Psychomotor Behavior:488884}   Attitude:   {Attitude:771559}   Orientation:   {Orientation:791766}   Speech Rate / Production: {Speech Rate/Production:693804}   Volume:   {Speech Volume:672695}   Mood:    {Mood:720353}   Affect:    {Affect:541239}   Thought Content:  {Thought Content:555401}   Thought Form:  {Thought Form:224578}   Insight:    {Insight:785846}     Diagnostic Criteria:   {DSM5 Classifications and Criteria:196413}        DSM5 Diagnoses: (Sustained by DSM5 Criteria Listed Above)     Diagnoses: {DSM5 MH Diagnosis:923621}     Psychosocial / Contextual Factors: {Psychosocial/Contextual Factors:310149}       Collateral Reports Completed:   {FCC COLLATERAL:528030}        PLAN: (Homework, other):     1. Patient was provided:  {Patient was provided:277821}     2. Provider recommended the following referrals: ***.        3. Suicide Risk and Safety Concerns were assessed for Aura Hope    Safety Plan:   {Safety Plan:809620}       {[facility]:956774}   May 1, 2025   Answers submitted by the patient for this visit:  Patient Health Questionnaire (Submitted on 5/1/2025)  If you checked off any problems, how difficult have these problems made it for you to do your work, take care of things at home, or get along with  other people?: Somewhat difficult  PHQ9 TOTAL SCORE: 6     or get along with other people?: Somewhat difficult  PHQ9 TOTAL SCORE: 6

## 2025-05-06 ENCOUNTER — PATIENT OUTREACH (OUTPATIENT)
Dept: CARE COORDINATION | Facility: CLINIC | Age: 40
End: 2025-05-06
Payer: COMMERCIAL

## 2025-05-08 ENCOUNTER — PATIENT OUTREACH (OUTPATIENT)
Dept: CARE COORDINATION | Facility: CLINIC | Age: 40
End: 2025-05-08
Payer: COMMERCIAL

## 2025-07-17 ENCOUNTER — E-VISIT (OUTPATIENT)
Dept: URGENT CARE | Facility: CLINIC | Age: 40
End: 2025-07-17
Payer: COMMERCIAL

## 2025-07-17 ENCOUNTER — LAB (OUTPATIENT)
Dept: LAB | Facility: CLINIC | Age: 40
End: 2025-07-17
Payer: COMMERCIAL

## 2025-07-17 DIAGNOSIS — R30.0 DYSURIA: Primary | ICD-10-CM

## 2025-07-17 DIAGNOSIS — R30.0 DYSURIA: ICD-10-CM

## 2025-07-17 LAB
ALBUMIN UR-MCNC: NEGATIVE MG/DL
APPEARANCE UR: CLEAR
BILIRUB UR QL STRIP: NEGATIVE
CLUE CELLS: ABNORMAL
COLOR UR AUTO: YELLOW
GLUCOSE UR STRIP-MCNC: NEGATIVE MG/DL
HGB UR QL STRIP: NEGATIVE
KETONES UR STRIP-MCNC: NEGATIVE MG/DL
LEUKOCYTE ESTERASE UR QL STRIP: NEGATIVE
NITRATE UR QL: NEGATIVE
PH UR STRIP: 6.5 [PH] (ref 5–7)
SP GR UR STRIP: 1.02 (ref 1–1.03)
TRICHOMONAS, WET PREP: ABNORMAL
UROBILINOGEN UR STRIP-ACNC: 0.2 E.U./DL
WBC'S/HIGH POWER FIELD, WET PREP: ABNORMAL
YEAST, WET PREP: ABNORMAL

## 2025-07-17 NOTE — PATIENT INSTRUCTIONS
Deaxi Chavarria,     After reviewing your responses, I would like you to come in for a urine test to make sure we treat you correctly. This urine test is to evaluate you for a possible urinary tract infection, and should be completed today or tomorrow. Schedule a Lab Only appointment here.     If a Lab appointment is not available, please check this site for lab locations and hours (many labs are closed evenings and weekends). You may walk into any Lab location during their operating hours without an appointment to complete your urine test. Please let the lab staff know that you have had an eVisit and your provider has ordered a urine test.     You will receive instructions with your results in [facility] once they are available.     If your symptoms worsen, you develop pain in your back or stomach, develop fevers, or are not improving in 5 days, please contact your primary care provider for an appointment or visit a Walk-in or Urgent Care Center to be seen.     Thanks again for choosing us as your health care partner,     Callie Lima PA-C

## 2025-07-24 ENCOUNTER — MYC REFILL (OUTPATIENT)
Dept: FAMILY MEDICINE | Facility: CLINIC | Age: 40
End: 2025-07-24
Payer: COMMERCIAL

## 2025-07-24 DIAGNOSIS — B00.9 HERPES SIMPLEX VIRUS INFECTION: ICD-10-CM

## 2025-07-24 RX ORDER — VALACYCLOVIR HYDROCHLORIDE 1 G/1
2000 TABLET, FILM COATED ORAL 2 TIMES DAILY
Qty: 40 TABLET | Refills: 1 | Status: SHIPPED | OUTPATIENT
Start: 2025-07-24

## 2025-08-19 ENCOUNTER — E-VISIT (OUTPATIENT)
Dept: URGENT CARE | Facility: CLINIC | Age: 40
End: 2025-08-19
Payer: COMMERCIAL

## 2025-08-19 DIAGNOSIS — N30.90 BLADDER INFECTION: Primary | ICD-10-CM

## 2025-08-19 RX ORDER — NITROFURANTOIN 25; 75 MG/1; MG/1
100 CAPSULE ORAL 2 TIMES DAILY
Qty: 10 CAPSULE | Refills: 0 | Status: SHIPPED | OUTPATIENT
Start: 2025-08-19 | End: 2025-08-24